# Patient Record
Sex: FEMALE | Race: WHITE | Employment: FULL TIME | URBAN - METROPOLITAN AREA
[De-identification: names, ages, dates, MRNs, and addresses within clinical notes are randomized per-mention and may not be internally consistent; named-entity substitution may affect disease eponyms.]

---

## 2023-09-20 PROBLEM — E55.9 VITAMIN D INSUFFICIENCY: Status: ACTIVE | Noted: 2023-09-20

## 2023-09-20 PROBLEM — E10.9 DIABETES MELLITUS TYPE 1 (MULTI): Status: ACTIVE | Noted: 2023-09-20

## 2023-09-20 PROBLEM — E10.65 TYPE 1 DIABETES MELLITUS WITH HYPERGLYCEMIA (MULTI): Status: ACTIVE | Noted: 2023-09-20

## 2023-09-20 PROBLEM — E03.9 HYPOTHYROIDISM: Status: ACTIVE | Noted: 2023-09-20

## 2023-09-20 PROBLEM — E11.10 DIABETIC KETOACIDOSIS (MULTI): Status: ACTIVE | Noted: 2023-09-20

## 2023-09-20 RX ORDER — NAPROXEN SODIUM 220 MG
TABLET ORAL
COMMUNITY
Start: 2019-02-14

## 2023-09-20 RX ORDER — ATORVASTATIN CALCIUM 10 MG/1
1 TABLET, FILM COATED ORAL DAILY
COMMUNITY
Start: 2018-10-16 | End: 2023-10-26 | Stop reason: ALTCHOICE

## 2023-09-20 RX ORDER — BLOOD-GLUCOSE TRANSMITTER
EACH MISCELLANEOUS
COMMUNITY
End: 2023-11-29 | Stop reason: WASHOUT

## 2023-09-20 RX ORDER — BLOOD-GLUCOSE METER
EACH MISCELLANEOUS
COMMUNITY
Start: 2022-09-28

## 2023-09-20 RX ORDER — BLOOD-GLUCOSE SENSOR
EACH MISCELLANEOUS
COMMUNITY
End: 2023-10-28

## 2023-09-20 RX ORDER — INSULIN LISPRO 100 [IU]/ML
50 INJECTION, SOLUTION INTRAVENOUS; SUBCUTANEOUS DAILY
COMMUNITY
Start: 2015-02-06 | End: 2023-11-29 | Stop reason: WASHOUT

## 2023-09-20 RX ORDER — INSULIN GLARGINE 100 [IU]/ML
INJECTION, SOLUTION SUBCUTANEOUS
COMMUNITY
Start: 2015-05-18 | End: 2023-10-26 | Stop reason: ALTCHOICE

## 2023-09-20 RX ORDER — BUSPIRONE HYDROCHLORIDE 5 MG/1
2 TABLET ORAL 2 TIMES DAILY
COMMUNITY
Start: 2023-01-25 | End: 2023-10-26 | Stop reason: ALTCHOICE

## 2023-09-20 RX ORDER — LEVOTHYROXINE SODIUM 150 UG/1
1 TABLET ORAL
COMMUNITY

## 2023-09-20 RX ORDER — ATORVASTATIN CALCIUM 40 MG/1
1 TABLET, FILM COATED ORAL DAILY
COMMUNITY

## 2023-09-20 RX ORDER — LANCETS 33 GAUGE
EACH MISCELLANEOUS
COMMUNITY
Start: 2022-09-28

## 2023-10-26 ENCOUNTER — TELEMEDICINE (OUTPATIENT)
Dept: PRIMARY CARE | Facility: CLINIC | Age: 28
End: 2023-10-26
Payer: COMMERCIAL

## 2023-10-26 VITALS — OXYGEN SATURATION: 97 %

## 2023-10-26 DIAGNOSIS — F41.9 ANXIETY: Primary | ICD-10-CM

## 2023-10-26 PROCEDURE — 99213 OFFICE O/P EST LOW 20 MIN: CPT | Performed by: NURSE PRACTITIONER

## 2023-10-26 RX ORDER — BUSPIRONE HYDROCHLORIDE 10 MG/1
10 TABLET ORAL 2 TIMES DAILY
COMMUNITY
Start: 2023-09-28 | End: 2023-10-26 | Stop reason: SDUPTHER

## 2023-10-26 RX ORDER — BUSPIRONE HYDROCHLORIDE 15 MG/1
15 TABLET ORAL 2 TIMES DAILY
Qty: 60 TABLET | Refills: 1 | Status: SHIPPED | OUTPATIENT
Start: 2023-10-26 | End: 2023-11-29 | Stop reason: SDUPTHER

## 2023-10-26 RX ORDER — BUSPIRONE HYDROCHLORIDE 15 MG/1
15 TABLET ORAL 2 TIMES DAILY
Qty: 60 TABLET | Refills: 1 | Status: SHIPPED | OUTPATIENT
Start: 2023-10-26 | End: 2023-10-26 | Stop reason: SDUPTHER

## 2023-10-26 ASSESSMENT — PAIN SCALES - GENERAL: PAINLEVEL: 0-NO PAIN

## 2023-10-28 DIAGNOSIS — E10.69 TYPE 1 DIABETES MELLITUS WITH OTHER SPECIFIED COMPLICATION (MULTI): Primary | ICD-10-CM

## 2023-10-28 RX ORDER — BLOOD-GLUCOSE SENSOR
EACH MISCELLANEOUS
Qty: 9 EACH | Refills: 3 | Status: SHIPPED | OUTPATIENT
Start: 2023-10-28

## 2023-11-02 DIAGNOSIS — E10.65 TYPE 1 DIABETES MELLITUS WITH HYPERGLYCEMIA (MULTI): Primary | ICD-10-CM

## 2023-11-02 RX ORDER — INSULIN LISPRO 100 [IU]/ML
100 INJECTION, SOLUTION INTRAVENOUS; SUBCUTANEOUS DAILY
Qty: 90 ML | Refills: 3 | Status: SHIPPED | OUTPATIENT
Start: 2023-11-02 | End: 2024-11-01

## 2023-11-27 ENCOUNTER — APPOINTMENT (OUTPATIENT)
Dept: PRIMARY CARE | Facility: CLINIC | Age: 28
End: 2023-11-27
Payer: COMMERCIAL

## 2023-11-29 ENCOUNTER — TELEMEDICINE (OUTPATIENT)
Dept: PRIMARY CARE | Facility: CLINIC | Age: 28
End: 2023-11-29
Payer: COMMERCIAL

## 2023-11-29 DIAGNOSIS — F41.9 ANXIETY: Primary | ICD-10-CM

## 2023-11-29 PROBLEM — E11.10 DIABETIC KETOACIDOSIS (MULTI): Status: RESOLVED | Noted: 2023-09-20 | Resolved: 2023-11-29

## 2023-11-29 PROBLEM — E03.9 HYPOTHYROIDISM: Status: RESOLVED | Noted: 2023-09-20 | Resolved: 2023-11-29

## 2023-11-29 PROBLEM — E03.9 HYPOTHYROIDISM, ACQUIRED: Status: ACTIVE | Noted: 2018-03-26

## 2023-11-29 PROBLEM — E10.65 TYPE 1 DIABETES MELLITUS WITH HYPERGLYCEMIA (MULTI): Status: RESOLVED | Noted: 2023-09-20 | Resolved: 2023-11-29

## 2023-11-29 PROCEDURE — 99213 OFFICE O/P EST LOW 20 MIN: CPT | Performed by: NURSE PRACTITIONER

## 2023-11-29 RX ORDER — BUSPIRONE HYDROCHLORIDE 30 MG/1
30 TABLET ORAL NIGHTLY
Qty: 30 TABLET | Refills: 5 | Status: SHIPPED | OUTPATIENT
Start: 2023-11-29 | End: 2023-12-27 | Stop reason: SDUPTHER

## 2023-11-29 RX ORDER — BUSPIRONE HYDROCHLORIDE 15 MG/1
15 TABLET ORAL
Qty: 30 TABLET | Refills: 5 | Status: SHIPPED | OUTPATIENT
Start: 2023-11-29 | End: 2023-12-27 | Stop reason: SDUPTHER

## 2023-11-29 ASSESSMENT — ANXIETY QUESTIONNAIRES
2. NOT BEING ABLE TO STOP OR CONTROL WORRYING: NEARLY EVERY DAY
GAD7 TOTAL SCORE: 20
1. FEELING NERVOUS, ANXIOUS, OR ON EDGE: NEARLY EVERY DAY
IF YOU CHECKED OFF ANY PROBLEMS ON THIS QUESTIONNAIRE, HOW DIFFICULT HAVE THESE PROBLEMS MADE IT FOR YOU TO DO YOUR WORK, TAKE CARE OF THINGS AT HOME, OR GET ALONG WITH OTHER PEOPLE: NOT DIFFICULT AT ALL
3. WORRYING TOO MUCH ABOUT DIFFERENT THINGS: NEARLY EVERY DAY
4. TROUBLE RELAXING: NEARLY EVERY DAY
5. BEING SO RESTLESS THAT IT IS HARD TO SIT STILL: MORE THAN HALF THE DAYS
7. FEELING AFRAID AS IF SOMETHING AWFUL MIGHT HAPPEN: NEARLY EVERY DAY
6. BECOMING EASILY ANNOYED OR IRRITABLE: NEARLY EVERY DAY

## 2023-11-29 ASSESSMENT — PATIENT HEALTH QUESTIONNAIRE - PHQ9
1. LITTLE INTEREST OR PLEASURE IN DOING THINGS: SEVERAL DAYS
SUM OF ALL RESPONSES TO PHQ9 QUESTIONS 1 AND 2: 1
2. FEELING DOWN, DEPRESSED OR HOPELESS: NOT AT ALL

## 2023-11-29 ASSESSMENT — PAIN SCALES - GENERAL: PAINLEVEL: 0-NO PAIN

## 2023-11-29 NOTE — PROGRESS NOTES
"PETRith patient's permission this is a telemedicine visit with video and audio.  History Of Present Illness  Haley Sheets is a 27 y.o. female who calls in for Follow-up (Buspirone follow up- pt states she has does not feel like the medication is completely taking care of her anxiety, still having anxiety every day).    HPI 27 year old female doing a telehealth for buspar increased to 15 BID still having issues with anxiety though she does feel like it is helping a little bit she is not feeling as good as she was hoping she would feel she would like to try increasing the medication if possible we will have her do 15 in the morning and 30 at night will have her schedule a 4-week follow-up to see how she is doing no other concerns today    Past Medical History  She has a past medical history of Other specified health status.    Medications  Current Outpatient Medications   Medication Instructions    atorvastatin (Lipitor) 40 mg tablet 1 tablet, oral, Daily    blood sugar diagnostic (OneTouch Verio test strips) strip as directed to test blood sugar daily In Vitro for 90 days    busPIRone (BUSPAR) 30 mg, oral, Nightly    busPIRone (BUSPAR) 15 mg, oral, Daily with breakfast    Dexcom G6 Sensor device USE AS DIRECTED EVERY 10 DAYS    glucagon (GLUCAGEN) 1 mg, intramuscular, as directed for severe hypoglycemia    insulin lispro (HUMALOG) 100 Units, subcutaneous, Daily, Take as directed per insulin instructions.    insulin syringe-needle U-100 31G X 5/16\" 0.5 mL syringe use 4-6 daily for injections as needed for insulin pump failure    lancets 33 gauge misc as directed to check blood sugar daily for 90 days    levonorgestrel (MIRENA UTRN) Mirena    levothyroxine (Synthroid, Levoxyl) 150 mcg tablet 1 tablet, oral, Daily before breakfast        Surgical History  She has a past surgical history that includes Mouth surgery (11/06/2014).     Social History  She reports that she has never smoked. She has never been exposed to " tobacco smoke. She has never used smokeless tobacco. She reports that she does not drink alcohol and does not use drugs.    Family History  Family History   Problem Relation Name Age of Onset    No Known Problems Mother      Heart disease Father      Hypertension Father      No Known Problems Brother      Diabetes type II Maternal Grandmother      Diabetes type II Maternal Grandfather      No Known Problems Paternal Grandmother          Allergies  Acetaminophen and Corticosteroids (glucocorticoids)    On video:     Appearance: Normal appearance.      Effort: Respiratory effort is normal. Speaking in full sentences. No respiratory distress.     Mood and Affect: Mood normal.         Thought Content: Thought content normal.         Judgment: Judgment normal.      Last Recorded Vitals  There were no vitals taken for this visit.  (Vitals are taken by patient at home, if any reported)    Relevant Results      Assessment/Plan   Haley was seen today for follow-up.  Diagnoses and all orders for this visit:  Anxiety (Primary)  -     busPIRone (Buspar) 30 mg tablet; Take 1 tablet (30 mg) by mouth once daily at bedtime.  -     busPIRone (Buspar) 15 mg tablet; Take 1 tablet (15 mg) by mouth once daily with a meal.          Jennyfer Pennington, APRN-CNP

## 2023-12-06 ENCOUNTER — APPOINTMENT (OUTPATIENT)
Dept: ENDOCRINOLOGY | Facility: CLINIC | Age: 28
End: 2023-12-06
Payer: COMMERCIAL

## 2023-12-27 DIAGNOSIS — F41.9 ANXIETY: ICD-10-CM

## 2023-12-27 RX ORDER — BUSPIRONE HYDROCHLORIDE 15 MG/1
15 TABLET ORAL
Qty: 30 TABLET | Refills: 0 | Status: SHIPPED | OUTPATIENT
Start: 2023-12-27 | End: 2024-01-26

## 2023-12-27 RX ORDER — BUSPIRONE HYDROCHLORIDE 30 MG/1
30 TABLET ORAL NIGHTLY
Qty: 30 TABLET | Refills: 0 | Status: SHIPPED | OUTPATIENT
Start: 2023-12-27 | End: 2024-01-26

## 2023-12-27 NOTE — PROGRESS NOTES
HPI:   27 yo with Diabetes 1(dx age 12) , Hashimoto's dz, Dyslipidemia, depression/anxiety presents for followup. A1c-7.9% today, last visit 7.6% Pt is testing sugars 7times per day, using dexcom . Pt is having low sugars 0-1 times/week. Pt is doing better with following a carb controlled diet and knows reasonable carb allowances. Pt is able to afford their medications. Pt is exercising.           Taking levothyroxine 150mcg, euthyroid, no obstructive sx.         Taking atorvastatin 40mg.           Tslim pump with dexcom g6         basals: (45.4 units)         mn-2         3am-1.7         1pm-2.4         5pm-2         10pm-1.4         I:C         1:5 mn         1:4 6am         1:7 at 1pm         5pm-1:5 - she did not increase to 1:4.5 after last visit         ISf-20         target: 110           30 days dexcom data: 56% in range, 1% low, pattern: mid 200's overnight, waking mid 100's and lunch, after lunch low 200's, upper 100's at dinner, after dinner mid 200's into bedtime.           -pt has been better about bolusing before meals, not limiting carbs at meals -some meals 80 grams    TDD:  111.18 units  Sleep mode: not set  Exercise mode: uses prn  Infusion sets: changing every 3 days, denies problems with sites  -back up basal insulin at home but may be   -pump failure protocol reviewed  -reinforced treating with 5-10 grams of fasting acting carbs for hypoglycemia     The pros and cons, risks and benefits and mechanism of action of the GLP-1 mimetics were discussed with the patient, reinforced not compatible with pregnancy (has IUD)  The patient was instructed to not eat if not hungry.   The patient was instructed on self-administration of the injected GLP-1 mimetic.     /80 (BP Location: Right arm, Patient Position: Sitting)   Pulse 79   Wt 101 kg (221 lb 9.6 oz)   BMI 36.88 kg/m²     Current Outpatient Medications:     atorvastatin (Lipitor) 40 mg tablet, Take 1 tablet (40 mg) by mouth once  "daily., Disp: , Rfl:     blood sugar diagnostic (OneTouch Verio test strips) strip, as directed to test blood sugar daily In Vitro for 90 days, Disp: , Rfl:     busPIRone (Buspar) 15 mg tablet, Take 1 tablet (15 mg) by mouth once daily with breakfast., Disp: 30 tablet, Rfl: 0    busPIRone (Buspar) 30 mg tablet, Take 1 tablet (30 mg) by mouth once daily at bedtime., Disp: 30 tablet, Rfl: 0    Dexcom G6 Sensor device, USE AS DIRECTED EVERY 10 DAYS, Disp: 9 each, Rfl: 3    insulin lispro (HumaLOG) 100 unit/mL injection, Inject 1 mL (100 Units) under the skin once daily. Take as directed per insulin instructions., Disp: 90 mL, Rfl: 3    insulin syringe-needle U-100 31G X 5/16\" 0.5 mL syringe, use 4-6 daily for injections as needed for insulin pump failure, Disp: , Rfl:     lancets 33 gauge misc, as directed to check blood sugar daily for 90 days, Disp: , Rfl:     levonorgestrel (MIRENA UTRN), Mirena, Disp: , Rfl:     levothyroxine (Synthroid, Levoxyl) 150 mcg tablet, Take 1 tablet (150 mcg) by mouth once daily in the morning. Take before meals., Disp: , Rfl:   Labs:  Lab Results   Component Value Date    WBC 8.5 09/13/2022    NRBC 0 09/13/2022    RBC 4.99 (H) 09/13/2022    HGB 14.5 09/13/2022    HCT 43.2 09/13/2022     09/13/2022     Lab Results   Component Value Date    CALCIUM 9.2 09/13/2022    AST 17 09/13/2022    ALKPHOS 105 09/13/2022    BILITOT 0.4 09/13/2022    PROT 7.3 09/13/2022    ALBUMIN 4.1 09/13/2022    GLOB 3.2 09/13/2022    AGR 1.3 (L) 09/13/2022     09/13/2022    K 4.0 09/13/2022     09/13/2022    CO2 21 (L) 09/13/2022    ANIONGAP 14 09/13/2022    BUN 11 09/13/2022    CREATININE 0.7 09/13/2022    UREACREAUR 15.7 09/13/2022    GLUCOSE 65 09/13/2022    ALT 23 09/13/2022    EGFR 123 09/13/2022     Lab Results   Component Value Date    CHOL 233 (H) 09/13/2022    TRIG 154 (H) 09/13/2022    HDL 40 (L) 09/13/2022    LDLCALC 162 (H) 09/13/2022     Lab Results   Component Value Date    " "MICROALBCREA 6.9 09/13/2022     Lab Results   Component Value Date    TSH 0.11 (L) 09/13/2022     No results found for: \"SNWBCEYL21\"  Lab Results   Component Value Date    HGBA1C 7.9 (A) 12/29/2023       Assessment and Plan:    1. Type 1 diabetes mellitus with hyperglycemia (CMS/HCC)  -postprandial hyperglycemia; increase ICR                breakfast to 1:3 (was 1:4)               lunch 1:6 (was 1:7)               dinner 1:3 (was 1:4)    -weight up 5.4 pounds, BMI 36.88, start ozempic 0.25 mg weekly for 4 weeks, then 0.5 mg (pending coverage),    reinforced lower carb meals 0-45 grams    -consider change in correction factor and addition of sleep mode next visit            2. Hypothyroidism, acquired  -on therapy, labs ordered today      Medical Decision Making  Complexity of problem:  Chronic illness of diabetes mellitus uncontrolled, progressing  Data analyzed and reviewed:  Reviewed prior notes,  Reviewed blood glucose data, labs including HgbA1c, lipids, serum chemistries  Ordered tests  Risk of complications and morbidities  Is definite because of use of insulin and risk of hypoglycemia  Prescription medications reviewed and modifications made  Compliance assessed  Addressed social determinants of health including food insecurity.      Treatment and plan discussed with Dr. Carreno. [facility] and       "

## 2023-12-27 NOTE — TELEPHONE ENCOUNTER
Per pt request please send in refill. Pt will be calling back to schedule follow up towards the end of January.

## 2023-12-29 ENCOUNTER — CLINICAL SUPPORT (OUTPATIENT)
Dept: ENDOCRINOLOGY | Facility: CLINIC | Age: 28
End: 2023-12-29
Payer: COMMERCIAL

## 2023-12-29 VITALS
DIASTOLIC BLOOD PRESSURE: 80 MMHG | HEART RATE: 79 BPM | BODY MASS INDEX: 36.88 KG/M2 | SYSTOLIC BLOOD PRESSURE: 110 MMHG | WEIGHT: 221.6 LBS

## 2023-12-29 DIAGNOSIS — E10.65 TYPE 1 DIABETES MELLITUS WITH HYPERGLYCEMIA (MULTI): Primary | ICD-10-CM

## 2023-12-29 DIAGNOSIS — E03.9 HYPOTHYROIDISM, ACQUIRED: ICD-10-CM

## 2023-12-29 LAB — POC HEMOGLOBIN A1C: 7.9 % (ref 4.2–6.5)

## 2023-12-29 PROCEDURE — 83036 HEMOGLOBIN GLYCOSYLATED A1C: CPT | Performed by: INTERNAL MEDICINE

## 2023-12-29 PROCEDURE — 99214 OFFICE O/P EST MOD 30 MIN: CPT | Performed by: INTERNAL MEDICINE

## 2023-12-29 PROCEDURE — 95251 CONT GLUC MNTR ANALYSIS I&R: CPT | Performed by: INTERNAL MEDICINE

## 2023-12-29 RX ORDER — SEMAGLUTIDE 0.68 MG/ML
0.5 INJECTION, SOLUTION SUBCUTANEOUS
Qty: 3 ML | Refills: 5 | Status: SHIPPED | OUTPATIENT
Start: 2023-12-29 | End: 2024-03-29 | Stop reason: SDUPTHER

## 2023-12-29 ASSESSMENT — PAIN SCALES - GENERAL: PAINLEVEL: 0-NO PAIN

## 2023-12-29 NOTE — PATIENT INSTRUCTIONS
See pump settings for changes    Start Ozempic 0.25 mg weekly for 4 weeks,  Then 0.5 mg weekly    Go for labwork

## 2023-12-29 NOTE — PROGRESS NOTES
I, Dr Jairon Carreno, have reviewed this progress note, medication list, vital signs, any pertinent lab values, and any CGM data if present with the [facility] and  face to face during this visit today. This note reflects the treatment plan that was made under my direction after reviewing the above mentioned elements while face to face with the patient and CDE.  I personally answered and addressed any questions and concerns the patient had during the visit today.  The CDE entered the data in this note under my direction and I personally reviewed it, signed any lab or medication orders that I instructed to be completed. I am the billing provider for this visit and the level of service was determined by my involvement in the Medical Decision Making Component of this visit while face to face with the patient.    Jairon Carreno MD

## 2023-12-30 ENCOUNTER — LAB (OUTPATIENT)
Dept: LAB | Facility: LAB | Age: 28
End: 2023-12-30
Payer: COMMERCIAL

## 2023-12-30 DIAGNOSIS — E03.9 HYPOTHYROIDISM, ACQUIRED: ICD-10-CM

## 2023-12-30 DIAGNOSIS — E10.65 TYPE 1 DIABETES MELLITUS WITH HYPERGLYCEMIA (MULTI): ICD-10-CM

## 2023-12-30 LAB
ALBUMIN SERPL-MCNC: 4.1 G/DL (ref 3.5–5)
ALP BLD-CCNC: 102 U/L (ref 35–125)
ALT SERPL-CCNC: 23 U/L (ref 5–40)
ANION GAP SERPL CALC-SCNC: 11 MMOL/L
AST SERPL-CCNC: 17 U/L (ref 5–40)
BILIRUB SERPL-MCNC: 0.5 MG/DL (ref 0.1–1.2)
BUN SERPL-MCNC: 13 MG/DL (ref 8–25)
CALCIUM SERPL-MCNC: 8.9 MG/DL (ref 8.5–10.4)
CHLORIDE SERPL-SCNC: 104 MMOL/L (ref 97–107)
CHOLEST SERPL-MCNC: 168 MG/DL (ref 133–200)
CHOLEST/HDLC SERPL: 3.3 {RATIO}
CO2 SERPL-SCNC: 24 MMOL/L (ref 24–31)
CREAT SERPL-MCNC: 0.8 MG/DL (ref 0.4–1.6)
CREAT UR-MCNC: 293.9 MG/DL
ERYTHROCYTE [DISTWIDTH] IN BLOOD BY AUTOMATED COUNT: 11.9 % (ref 11.5–14.5)
GFR SERPL CREATININE-BSD FRML MDRD: >90 ML/MIN/1.73M*2
GLUCOSE SERPL-MCNC: 186 MG/DL (ref 65–99)
HCT VFR BLD AUTO: 43.9 % (ref 36–46)
HDLC SERPL-MCNC: 51 MG/DL
HGB BLD-MCNC: 14.5 G/DL (ref 12–16)
LDLC SERPL CALC-MCNC: 101 MG/DL (ref 65–130)
MCH RBC QN AUTO: 28.8 PG (ref 26–34)
MCHC RBC AUTO-ENTMCNC: 33 G/DL (ref 32–36)
MCV RBC AUTO: 87 FL (ref 80–100)
MICROALBUMIN UR-MCNC: <12 MG/L (ref 0–23)
MICROALBUMIN/CREAT UR: NORMAL MG/G{CREAT}
NRBC BLD-RTO: 0 /100 WBCS (ref 0–0)
PLATELET # BLD AUTO: 327 X10*3/UL (ref 150–450)
POTASSIUM SERPL-SCNC: 4.8 MMOL/L (ref 3.4–5.1)
PROT SERPL-MCNC: 6.4 G/DL (ref 5.9–7.9)
RBC # BLD AUTO: 5.04 X10*6/UL (ref 4–5.2)
SODIUM SERPL-SCNC: 139 MMOL/L (ref 133–145)
TRIGL SERPL-MCNC: 79 MG/DL (ref 40–150)
TSH SERPL DL<=0.05 MIU/L-ACNC: 2.62 MIU/L (ref 0.27–4.2)
WBC # BLD AUTO: 6.9 X10*3/UL (ref 4.4–11.3)

## 2023-12-30 PROCEDURE — 82570 ASSAY OF URINE CREATININE: CPT

## 2023-12-30 PROCEDURE — 84443 ASSAY THYROID STIM HORMONE: CPT

## 2023-12-30 PROCEDURE — 80061 LIPID PANEL: CPT

## 2023-12-30 PROCEDURE — 82043 UR ALBUMIN QUANTITATIVE: CPT

## 2023-12-30 PROCEDURE — 85027 COMPLETE CBC AUTOMATED: CPT

## 2023-12-30 PROCEDURE — 36415 COLL VENOUS BLD VENIPUNCTURE: CPT

## 2023-12-30 PROCEDURE — 80053 COMPREHEN METABOLIC PANEL: CPT

## 2024-03-27 NOTE — PROGRESS NOTES
"HPI    29 yo with Diabetes 1(dx age 12) , Hashimoto's dz, Dyslipidemia, depression/anxiety, pt has IUD presents for followup. A1c-6.5% today.     Pt is testing sugars 7times per day, using dexcom g6 . Pt is having low sugars 0-1 times/week. Pt is doing better with following a carb controlled diet and knows reasonable carb allowances. Pt is able to afford their medications. Pt is exercising.    -add ozempic 0.5mg last visit, lost nearly 20lbs   30 days g6 data: 72% in range, 2% low, pattern: upper 100's overnight, waking mid 100's and mostly maintains through the day.  -since I:C increases pt feels like she is having lows after meals, often entering fewer carbs than she eats           Taking levothyroxine 150mcg, euthyroid, no obstructive sx.           Taking atorvastatin 40mg.           Tslim pump with dexcom g6         basals: (45.4 units)         mn-2         3am-1.7         1pm-2.4         5pm-2         10pm-1.4         I:C         1:5 mn         1:3 6am         1:6 at 1pm         5pm-1:4         10pm-1:5         ISf-20         target: 110            30 days dexcom data: 56% in range, 1% low, pattern: mid 200's overnight, waking mid 100's and lunch, after lunch low 200's, upper 100's at dinner, after dinner mid 200's into bedtime.                Current Outpatient Medications:     atorvastatin (Lipitor) 40 mg tablet, Take 1 tablet (40 mg) by mouth once daily., Disp: , Rfl:     blood sugar diagnostic (OneTouch Verio test strips) strip, as directed to test blood sugar daily In Vitro for 90 days, Disp: , Rfl:     Dexcom G6 Sensor device, USE AS DIRECTED EVERY 10 DAYS, Disp: 9 each, Rfl: 3    Dexcom G6 Transmitter device, , Disp: , Rfl:     insulin lispro (HumaLOG) 100 unit/mL injection, Inject 1 mL (100 Units) under the skin once daily. Take as directed per insulin instructions., Disp: 90 mL, Rfl: 3    insulin syringe-needle U-100 31G X 5/16\" 0.5 mL syringe, use 4-6 daily for injections as needed for insulin pump " "failure, Disp: , Rfl:     lancets 33 gauge misc, as directed to check blood sugar daily for 90 days, Disp: , Rfl:     levonorgestrel (MIRENA UTRN), Mirena, Disp: , Rfl:     levothyroxine (Synthroid, Levoxyl) 150 mcg tablet, Take 1 tablet (150 mcg) by mouth once daily in the morning. Take before meals., Disp: , Rfl:     semaglutide (Ozempic) 0.25 mg or 0.5 mg (2 mg/3 mL) pen injector, Inject 0.5 mg under the skin 1 (one) time per week., Disp: 3 mL, Rfl: 5    busPIRone (Buspar) 15 mg tablet, Take 1 tablet (15 mg) by mouth once daily with breakfast., Disp: 30 tablet, Rfl: 0    busPIRone (Buspar) 30 mg tablet, Take 1 tablet (30 mg) by mouth once daily at bedtime., Disp: 30 tablet, Rfl: 0    glucagon 3 mg/actuation spray,non-aerosol, Administer 1 spray into affected nostril(s) if needed (for severe hypoglycemia)., Disp: 2 each, Rfl: 1      Allergies as of 03/29/2024 - Reviewed 03/29/2024   Allergen Reaction Noted    Acetaminophen Unknown 12/05/2018    Corticosteroids (glucocorticoids) Unknown 12/05/2018         Review of Systems   Cardiology: Lightheadedness-denies.  Chest pain-denies.  Leg edema-denies.  Palpitations-denies.  Respiratory: Cough-denies. Shortness of breath-denies.  Wheezing-denies.  Gastroenterology: Constipation +,  Diarrhea-denies.  Heartburn-denies.  Endocrinology: Cold intolerance-denies.  Heat intolerance-denies.  Sweats-denies.  Neurology: Headache-denies.  Tremor-denies.  Neuropathy in extremities-denies.  Psychology: Low energy-denies.  Irritability-denies.  Sleep disturbances-denies.      /69 (BP Location: Right arm)   Pulse 82   Ht 1.651 m (5' 5\")   Wt 91.2 kg (201 lb)   BMI 33.45 kg/m²       Labs:  Lab Results   Component Value Date    WBC 6.9 12/30/2023    NRBC 0.0 12/30/2023    RBC 5.04 12/30/2023    HGB 14.5 12/30/2023    HCT 43.9 12/30/2023     12/30/2023     Lab Results   Component Value Date    CALCIUM 8.9 12/30/2023    AST 17 12/30/2023    ALKPHOS 102 12/30/2023    " "BILITOT 0.5 12/30/2023    PROT 6.4 12/30/2023    ALBUMIN 4.1 12/30/2023    GLOB 3.2 09/13/2022    AGR 1.3 (L) 09/13/2022     12/30/2023    K 4.8 12/30/2023     12/30/2023    CO2 24 12/30/2023    ANIONGAP 11 12/30/2023    BUN 13 12/30/2023    CREATININE 0.80 12/30/2023    UREACREAUR 15.7 09/13/2022    GLUCOSE 186 (H) 12/30/2023    ALT 23 12/30/2023    EGFR >90 12/30/2023     Lab Results   Component Value Date    CHOL 168 12/30/2023    TRIG 79 12/30/2023    HDL 51.0 12/30/2023    LDLCALC 101 12/30/2023     Lab Results   Component Value Date    MICROALBCREA  12/30/2023      Comment:      One or more analytes used in this calculation is outside of the analytical measurement range. Calculation cannot be performed.     Lab Results   Component Value Date    TSH 2.62 12/30/2023     No results found for: \"CIYABNIW88\"  Lab Results   Component Value Date    HGBA1C 6.5 03/29/2024         Physical Exam   General Appearance: pleasant, cooperative, no acute distress  HEENT: no chemosis, no proptosis, no lid lag, no lid retraction  Neck: no lymphadenopathy, no thyromegaly, no dominant thyroid nodules  Heart: no murmurs, regular rate and rhythm, S1 and S2  Lungs: no wheezes, no rhonci, no rales  Extremities: no lower extremity swelling      Assessment/Plan   1. Type 1 diabetes mellitus with hyperglycemia (CMS/HCC)  -A1c ordered and reviewed  -pump/dexcom data reviewed, scanned in epic  -labs reviewed, new labs ordered    -doing outstanding on ozempic, having constipation, suggest miralax daily titrated for 1 bm q 1-2 days    -do pump upgrade, switch to dexcom g7    -pt having lows after meals now, change I:C ratios  -mn:1:6, 6am 1:4 or 5, 1pm1:7 or 8, 5pm 1:5 or 6, 10pm 1:6    2. Hypothyroidism, acquired  -euthyroid, continue current therapy, repeat labs prior to next visit    3. Dyslipidemia  -on statin, wt loss and sugar control should help, repeat prior to next visit         Follow Up:  soni OrtaD    Medical " Decision Making  Complexity of problem: Chronic illness of diabetes mellitus uncontrolled, progressing  Data analyzed and reviewed: Reviewed prior notes, blood glucose data, labs including HgbA1c, lipids, serum chemistries.  Ordered tests.   Risk of complications and morbidities: Is definite because of use of insulin and risk of hypoglycemia.  Prescription medications reviewed and modifications made.  Compliance assessed.  Addressed social determinants of health including food insecurity.

## 2024-03-29 ENCOUNTER — OFFICE VISIT (OUTPATIENT)
Dept: ENDOCRINOLOGY | Facility: CLINIC | Age: 29
End: 2024-03-29
Payer: COMMERCIAL

## 2024-03-29 VITALS
HEART RATE: 82 BPM | BODY MASS INDEX: 33.49 KG/M2 | SYSTOLIC BLOOD PRESSURE: 101 MMHG | WEIGHT: 201 LBS | HEIGHT: 65 IN | DIASTOLIC BLOOD PRESSURE: 69 MMHG

## 2024-03-29 DIAGNOSIS — E10.65 TYPE 1 DIABETES MELLITUS WITH HYPERGLYCEMIA (MULTI): Primary | ICD-10-CM

## 2024-03-29 DIAGNOSIS — E78.2 MIXED HYPERLIPIDEMIA: ICD-10-CM

## 2024-03-29 DIAGNOSIS — E03.9 HYPOTHYROIDISM, ACQUIRED: ICD-10-CM

## 2024-03-29 LAB — POC HEMOGLOBIN A1C: 6.5 % (ref 4.2–6.5)

## 2024-03-29 PROCEDURE — 99214 OFFICE O/P EST MOD 30 MIN: CPT | Performed by: INTERNAL MEDICINE

## 2024-03-29 PROCEDURE — 83036 HEMOGLOBIN GLYCOSYLATED A1C: CPT | Performed by: INTERNAL MEDICINE

## 2024-03-29 PROCEDURE — 95251 CONT GLUC MNTR ANALYSIS I&R: CPT | Performed by: INTERNAL MEDICINE

## 2024-03-29 PROCEDURE — 1036F TOBACCO NON-USER: CPT | Performed by: INTERNAL MEDICINE

## 2024-03-29 PROCEDURE — 3078F DIAST BP <80 MM HG: CPT | Performed by: INTERNAL MEDICINE

## 2024-03-29 PROCEDURE — 3074F SYST BP LT 130 MM HG: CPT | Performed by: INTERNAL MEDICINE

## 2024-03-29 RX ORDER — BLOOD-GLUCOSE TRANSMITTER
EACH MISCELLANEOUS
COMMUNITY
Start: 2024-02-01

## 2024-03-29 RX ORDER — BLOOD-GLUCOSE SENSOR
EACH MISCELLANEOUS
Qty: 9 EACH | Refills: 3 | Status: SHIPPED | OUTPATIENT
Start: 2024-03-29

## 2024-03-29 RX ORDER — SEMAGLUTIDE 0.68 MG/ML
0.5 INJECTION, SOLUTION SUBCUTANEOUS
Qty: 9 ML | Refills: 1 | Status: SHIPPED | OUTPATIENT
Start: 2024-03-29

## 2024-03-29 ASSESSMENT — PATIENT HEALTH QUESTIONNAIRE - PHQ9
2. FEELING DOWN, DEPRESSED OR HOPELESS: NOT AT ALL
1. LITTLE INTEREST OR PLEASURE IN DOING THINGS: NOT AT ALL
SUM OF ALL RESPONSES TO PHQ9 QUESTIONS 1 & 2: 0

## 2024-03-29 ASSESSMENT — ENCOUNTER SYMPTOMS
DEPRESSION: 0
LOSS OF SENSATION IN FEET: 0
OCCASIONAL FEELINGS OF UNSTEADINESS: 0

## 2024-03-29 ASSESSMENT — PAIN SCALES - GENERAL: PAINLEVEL: 0-NO PAIN

## 2024-05-23 ENCOUNTER — TELEPHONE (OUTPATIENT)
Dept: ENDOCRINOLOGY | Facility: CLINIC | Age: 29
End: 2024-05-23
Payer: COMMERCIAL

## 2024-05-31 ENCOUNTER — TELEPHONE (OUTPATIENT)
Dept: ENDOCRINOLOGY | Facility: CLINIC | Age: 29
End: 2024-05-31
Payer: COMMERCIAL

## 2024-07-01 ENCOUNTER — APPOINTMENT (OUTPATIENT)
Dept: ENDOCRINOLOGY | Facility: CLINIC | Age: 29
End: 2024-07-01
Payer: COMMERCIAL

## 2024-07-10 ENCOUNTER — APPOINTMENT (OUTPATIENT)
Dept: ENDOCRINOLOGY | Facility: CLINIC | Age: 29
End: 2024-07-10
Payer: COMMERCIAL

## 2024-07-10 VITALS
DIASTOLIC BLOOD PRESSURE: 76 MMHG | HEART RATE: 72 BPM | SYSTOLIC BLOOD PRESSURE: 118 MMHG | WEIGHT: 198 LBS | BODY MASS INDEX: 32.95 KG/M2

## 2024-07-10 DIAGNOSIS — E78.2 MIXED HYPERLIPIDEMIA: ICD-10-CM

## 2024-07-10 DIAGNOSIS — E10.65 TYPE 1 DIABETES MELLITUS WITH HYPERGLYCEMIA (MULTI): Primary | ICD-10-CM

## 2024-07-10 LAB — POC HEMOGLOBIN A1C: 6.9 % (ref 4.2–6.5)

## 2024-07-10 PROCEDURE — 99214 OFFICE O/P EST MOD 30 MIN: CPT | Performed by: INTERNAL MEDICINE

## 2024-07-10 PROCEDURE — 83036 HEMOGLOBIN GLYCOSYLATED A1C: CPT | Performed by: INTERNAL MEDICINE

## 2024-07-10 PROCEDURE — 95251 CONT GLUC MNTR ANALYSIS I&R: CPT | Performed by: INTERNAL MEDICINE

## 2024-07-10 RX ORDER — SEMAGLUTIDE 1.34 MG/ML
1 INJECTION, SOLUTION SUBCUTANEOUS
Qty: 9 ML | Refills: 1 | Status: SHIPPED | OUTPATIENT
Start: 2024-07-10

## 2024-07-10 NOTE — PATIENT INSTRUCTIONS
"Changes made to pump settings:  - carb ratio 1p from 1:6 to now 1:5  - carb ratio 5p from 1:4 to now 1:3.5    Increase Ozempic to 1mg weekly   - if you're wanting to try increasing more slowly first, dialing to 54 clicks would be equivalent to \"0.75mg\"  "

## 2024-07-10 NOTE — PROGRESS NOTES
HPI     29 yo with Diabetes 1(dx age 12) , Hashimoto's dz, Dyslipidemia, depression/anxiety, pt has IUD presents for followup. A1c-6.5% last visit,  6.9% today.    Pt is testing sugars 7 times per day, using dexcom g6 . Pt is having low sugars 0-1 times/week. Pt is doing better with following a carb controlled diet and knows reasonable carb allowances. Pt is able to afford their medications. Pt is exercising.    -added ozempic 0.5mg Dec 2023 visit, wt down from 222lbs to now 198lbs today.       Taking levothyroxine 150mcg, euthyroid, no obstructive sx.           Taking atorvastatin 40mg.           PUMP:  tandem tslim with dexcom g6 cgm using control IQ  basals: (45.4 units)         12a 2         3a 1.7         1p 2.4         5p 2         10p 1.4  ICR:          12a 1:5           6a 1:3         1p 1:6         5p 1:4         10p 1:5  ISF 20       - 30 days dexcom data: 65% in range, 1% low, pattern: mid 200's overnight, waking mid 100's and lunch, after lunch low 200's, upper 100's at dinner, after dinner mid 200's into bedtime.        Current Outpatient Medications:     atorvastatin (Lipitor) 40 mg tablet, Take 1 tablet (40 mg) by mouth once daily., Disp: , Rfl:     blood sugar diagnostic (OneTouch Verio test strips) strip, as directed to test blood sugar daily In Vitro for 90 days, Disp: , Rfl:     busPIRone (Buspar) 15 mg tablet, Take 1 tablet (15 mg) by mouth once daily with breakfast., Disp: 30 tablet, Rfl: 0    busPIRone (Buspar) 30 mg tablet, Take 1 tablet (30 mg) by mouth once daily at bedtime., Disp: 30 tablet, Rfl: 0    Dexcom G6 Sensor device, USE AS DIRECTED EVERY 10 DAYS, Disp: 9 each, Rfl: 3    Dexcom G6 Transmitter device, , Disp: , Rfl:     Dexcom G7 Sensor device, -change every 10 days, replaces dexcom g6, Disp: 9 each, Rfl: 3    glucagon 3 mg/actuation spray,non-aerosol, Administer 1 spray into affected nostril(s) if needed (for severe hypoglycemia)., Disp: 2 each, Rfl: 1    insulin lispro (HumaLOG)  "100 unit/mL injection, Inject 1 mL (100 Units) under the skin once daily. Take as directed per insulin instructions., Disp: 90 mL, Rfl: 3    insulin syringe-needle U-100 31G X 5/16\" 0.5 mL syringe, use 4-6 daily for injections as needed for insulin pump failure, Disp: , Rfl:     lancets 33 gauge misc, as directed to check blood sugar daily for 90 days, Disp: , Rfl:     levonorgestrel (MIRENA UTRN), Mirena, Disp: , Rfl:     levothyroxine (Synthroid, Levoxyl) 150 mcg tablet, Take 1 tablet (150 mcg) by mouth once daily in the morning. Take before meals., Disp: , Rfl:     semaglutide (Ozempic) 0.25 mg or 0.5 mg (2 mg/3 mL) pen injector, Inject 0.5 mg under the skin 1 (one) time per week., Disp: 9 mL, Rfl: 1    Allergies as of 07/10/2024 - Reviewed 03/29/2024   Allergen Reaction Noted    Acetaminophen Unknown 12/05/2018    Corticosteroids (glucocorticoids) Unknown 12/05/2018       /76   Pulse 72   Wt 89.8 kg (198 lb)   BMI 32.95 kg/m²     Labs:   Lab Results   Component Value Date    WBC 6.9 12/30/2023    NRBC 0.0 12/30/2023    RBC 5.04 12/30/2023    HGB 14.5 12/30/2023    HCT 43.9 12/30/2023     12/30/2023     Lab Results   Component Value Date    CALCIUM 8.9 12/30/2023    AST 17 12/30/2023    ALKPHOS 102 12/30/2023    BILITOT 0.5 12/30/2023    PROT 6.4 12/30/2023    ALBUMIN 4.1 12/30/2023    GLOB 3.2 09/13/2022    AGR 1.3 (L) 09/13/2022     12/30/2023    K 4.8 12/30/2023     12/30/2023    CO2 24 12/30/2023    ANIONGAP 11 12/30/2023    BUN 13 12/30/2023    CREATININE 0.80 12/30/2023    UREACREAUR 15.7 09/13/2022    GLUCOSE 186 (H) 12/30/2023    ALT 23 12/30/2023    EGFR >90 12/30/2023     Lab Results   Component Value Date    CHOL 168 12/30/2023    TRIG 79 12/30/2023    HDL 51.0 12/30/2023    LDLCALC 101 12/30/2023     Lab Results   Component Value Date    MICROALBCREA  12/30/2023      Comment:      One or more analytes used in this calculation is outside of the analytical measurement range. " "Calculation cannot be performed.     Lab Results   Component Value Date    TSH 2.62 12/30/2023     No results found for: \"YAXPOAGO31\"  Lab Results   Component Value Date    HGBA1C 6.9 (A) 07/10/2024         Assessment/Plan   1. Type 1 diabetes mellitus with hyperglycemia (Multi)    -A1c ordered and reviewed  -pump/dexcom data reviewed, scanned in epic  -labs reviewed, active orders given last visit to repeat     -doing outstanding on ozempic, increase to 1mg dose    -do pump upgrade, switch to dexcom g7    -pt having highs after meals now, change I:C ratios  -try 1pm from 1:6 to now 1:5,  5pm from 1:4 to now 1:3.5      2. Dyslipidemia  -on statin, wt loss and sugar control should help  -repeat fasting labs ordered last visit         Follow up: [id number]    -labs/tests/notes reviewed  -reviewed and counseled patient on medication monitoring and side effects    Treatment and plan discussed with Dr. Carreno.  CIARA Connors, PharmD, BC-ADM, [facility].     Medical Decision Making  Complexity of problem: Chronic illness of diabetes mellitus uncontrolled, progressing  Data analyzed and reviewed: Reviewed prior notes, blood glucose data, labs including HgbA1c, lipids, serum chemistries.  Ordered tests.   Risk of complications and morbidities: Is definite because of use of insulin and risk of hypoglycemia.  Prescription medications reviewed and modifications made.  Compliance assessed.  Addressed social determinants of health including food insecurity.    "

## 2024-07-16 NOTE — PROGRESS NOTES
Attestation signed by Jairon Carreno MD on 7/16/24 at 9:35 AM.    I, Dr Jairon Carreno, have reviewed this progress note, medication list, vital signs, any pertinent lab values, and any CGM data if present with the [facility] and  face to face during this visit today. This note reflects the treatment plan that was made under my direction after reviewing the above mentioned elements while face to face with the patient and CDE.  I personally answered and addressed any questions and concerns the patient had during the visit today.  The CDE entered the data in this note under my direction and I personally reviewed it, signed any lab or medication orders that I instructed to be completed. I am the billing provider for this visit and the level of service was determined by my involvement in the Medical Decision Making Component of this visit while face to face with the patient.

## 2024-08-12 DIAGNOSIS — E10.65 TYPE 1 DIABETES MELLITUS WITH HYPERGLYCEMIA (MULTI): ICD-10-CM

## 2024-08-12 RX ORDER — INSULIN LISPRO 100 [IU]/ML
INJECTION, SOLUTION INTRAVENOUS; SUBCUTANEOUS
Qty: 90 ML | Refills: 3 | Status: SHIPPED | OUTPATIENT
Start: 2024-08-12

## 2024-08-16 DIAGNOSIS — E10.65 TYPE 1 DIABETES MELLITUS WITH HYPERGLYCEMIA (MULTI): Primary | ICD-10-CM

## 2024-08-16 RX ORDER — ATORVASTATIN CALCIUM 40 MG/1
40 TABLET, FILM COATED ORAL DAILY
Qty: 90 TABLET | Refills: 3 | Status: SHIPPED | OUTPATIENT
Start: 2024-08-16

## 2024-08-26 DIAGNOSIS — E10.65 TYPE 1 DIABETES MELLITUS WITH HYPERGLYCEMIA (MULTI): Primary | ICD-10-CM

## 2024-08-26 RX ORDER — LEVOTHYROXINE SODIUM 150 UG/1
150 TABLET ORAL EVERY MORNING
Qty: 90 TABLET | Refills: 3 | Status: SHIPPED | OUTPATIENT
Start: 2024-08-26

## 2024-10-15 ENCOUNTER — OFFICE VISIT (OUTPATIENT)
Dept: PRIMARY CARE | Facility: CLINIC | Age: 29
End: 2024-10-15
Payer: COMMERCIAL

## 2024-10-15 VITALS
OXYGEN SATURATION: 99 % | SYSTOLIC BLOOD PRESSURE: 122 MMHG | WEIGHT: 193 LBS | BODY MASS INDEX: 32.15 KG/M2 | RESPIRATION RATE: 18 BRPM | DIASTOLIC BLOOD PRESSURE: 76 MMHG | HEART RATE: 84 BPM | TEMPERATURE: 98.1 F | HEIGHT: 65 IN

## 2024-10-15 DIAGNOSIS — F40.243 ANXIETY WITH FLYING: Primary | ICD-10-CM

## 2024-10-15 DIAGNOSIS — Z23 ENCOUNTER FOR IMMUNIZATION: ICD-10-CM

## 2024-10-15 PROCEDURE — 1036F TOBACCO NON-USER: CPT | Performed by: NURSE PRACTITIONER

## 2024-10-15 PROCEDURE — 3008F BODY MASS INDEX DOCD: CPT | Performed by: NURSE PRACTITIONER

## 2024-10-15 PROCEDURE — 3074F SYST BP LT 130 MM HG: CPT | Performed by: NURSE PRACTITIONER

## 2024-10-15 PROCEDURE — 3078F DIAST BP <80 MM HG: CPT | Performed by: NURSE PRACTITIONER

## 2024-10-15 PROCEDURE — 90471 IMMUNIZATION ADMIN: CPT | Performed by: NURSE PRACTITIONER

## 2024-10-15 PROCEDURE — 90656 IIV3 VACC NO PRSV 0.5 ML IM: CPT | Performed by: NURSE PRACTITIONER

## 2024-10-15 PROCEDURE — 99214 OFFICE O/P EST MOD 30 MIN: CPT | Performed by: NURSE PRACTITIONER

## 2024-10-15 RX ORDER — DIAZEPAM 5 MG/1
5 TABLET ORAL NIGHTLY PRN
Qty: 2 TABLET | Refills: 0 | Status: SHIPPED | OUTPATIENT
Start: 2024-10-15 | End: 2024-10-17

## 2024-10-15 ASSESSMENT — ANXIETY QUESTIONNAIRES
GAD7 TOTAL SCORE: 16
4. TROUBLE RELAXING: SEVERAL DAYS
1. FEELING NERVOUS, ANXIOUS, OR ON EDGE: NEARLY EVERY DAY
5. BEING SO RESTLESS THAT IT IS HARD TO SIT STILL: MORE THAN HALF THE DAYS
6. BECOMING EASILY ANNOYED OR IRRITABLE: SEVERAL DAYS
3. WORRYING TOO MUCH ABOUT DIFFERENT THINGS: NEARLY EVERY DAY
IF YOU CHECKED OFF ANY PROBLEMS ON THIS QUESTIONNAIRE, HOW DIFFICULT HAVE THESE PROBLEMS MADE IT FOR YOU TO DO YOUR WORK, TAKE CARE OF THINGS AT HOME, OR GET ALONG WITH OTHER PEOPLE: SOMEWHAT DIFFICULT
7. FEELING AFRAID AS IF SOMETHING AWFUL MIGHT HAPPEN: NEARLY EVERY DAY
2. NOT BEING ABLE TO STOP OR CONTROL WORRYING: NEARLY EVERY DAY

## 2024-10-15 ASSESSMENT — ENCOUNTER SYMPTOMS
CARDIOVASCULAR NEGATIVE: 1
MUSCULOSKELETAL NEGATIVE: 1
GASTROINTESTINAL NEGATIVE: 1
CONSTITUTIONAL NEGATIVE: 1
RESPIRATORY NEGATIVE: 1

## 2024-10-15 ASSESSMENT — PATIENT HEALTH QUESTIONNAIRE - PHQ9
1. LITTLE INTEREST OR PLEASURE IN DOING THINGS: NOT AT ALL
SUM OF ALL RESPONSES TO PHQ9 QUESTIONS 1 AND 2: 0
2. FEELING DOWN, DEPRESSED OR HOPELESS: NOT AT ALL

## 2024-10-15 ASSESSMENT — PAIN SCALES - GENERAL: PAINLEVEL: 0-NO PAIN

## 2024-10-15 NOTE — PROGRESS NOTES
"Chief Complaint  Haley Sheets is a 28 y.o. female presenting for \"Anxiety (Pt is here to get medication for anxiety to fly- only wants 2 pills, one for the flight to [place] and one for the flight back ).\"    Anxiety           Haley Sheets is a 28 y.o. female presenting for fear of flying she is going to [place] to see Nikki Flores.  She is afraid to fly.  The last flight she was on was very bumpy and since she has been afraid.  Since she has had a very hard time flying.        Past Medical History  Patient Active Problem List    Diagnosis Date Noted   • Diabetes mellitus type 1 (Multi) 09/20/2023   • Vitamin D insufficiency 09/20/2023   • Hypothyroidism, acquired 03/26/2018        Medications  Current Outpatient Medications   Medication Instructions   • atorvastatin (LIPITOR) 40 mg, oral, Daily   • blood sugar diagnostic (OneTouch Verio test strips) strip as directed to test blood sugar daily In Vitro for 90 days   • Dexcom G6 Sensor device USE AS DIRECTED EVERY 10 DAYS   • Dexcom G6 Transmitter device    • Dexcom G7 Sensor device -change every 10 days, replaces dexcom g6   • diazePAM (VALIUM) 5 mg, oral, Nightly PRN   • glucagon 3 mg/actuation spray,non-aerosol 1 spray, nasal, As needed   • HumaLOG U-100 Insulin 100 unit/mL injection INJECT 100 UNITS UNDER THE SKIN DAILY VIA PUMP   • insulin syringe-needle U-100 31G X 5/16\" 0.5 mL syringe use 4-6 daily for injections as needed for insulin pump failure   • lancets 33 gauge misc as directed to check blood sugar daily for 90 days   • levonorgestrel (MIRENA UTRN) Mirena   • levothyroxine (SYNTHROID, LEVOXYL) 150 mcg, oral, Every morning, take on an empy stomach   • Ozempic 1 mg, subcutaneous, Once Weekly        Surgical History  She has a past surgical history that includes Mouth surgery (11/06/2014) and Abscess drainage (Left, 01/2020).     Social History  She reports that she has never smoked. She has never been exposed to tobacco smoke. She has never used smokeless " tobacco. She reports that she does not drink alcohol and does not use drugs.    Family History  Family History   Problem Relation Name Age of Onset   • No Known Problems Mother     • Heart disease Father     • Hypertension Father     • No Known Problems Brother     • Diabetes type II Maternal Grandmother     • Diabetes type II Maternal Grandfather     • No Known Problems Paternal Grandmother          Allergies  Acetaminophen and Corticosteroids (glucocorticoids)    ROS  Review of Systems   Constitutional: Negative.    Respiratory: Negative.     Cardiovascular: Negative.    Gastrointestinal: Negative.    Genitourinary: Negative.    Musculoskeletal: Negative.         Last Recorded Vitals  /76 (BP Location: Left arm, Patient Position: Sitting, BP Cuff Size: Adult)   Pulse 84   Temp 36.7 °C (98.1 °F)   Resp 18   Wt 87.5 kg (193 lb)   SpO2 99%     Physical Exam  Vitals and nursing note reviewed.   Constitutional:       Appearance: Normal appearance.   Cardiovascular:      Rate and Rhythm: Normal rate and regular rhythm.      Pulses: Normal pulses.      Heart sounds: Normal heart sounds.   Pulmonary:      Effort: Pulmonary effort is normal.      Breath sounds: Normal breath sounds.   Neurological:      Mental Status: She is alert.   Psychiatric:         Mood and Affect: Mood normal.       Relevant Results      Assessment/Plan   Haley was seen today for anxiety.  Diagnoses and all orders for this visit:  Anxiety with flying (Primary)  -     diazePAM (Valium) 5 mg tablet; Take 1 tablet (5 mg) by mouth as needed at bedtime for anxiety, sleep or muscle spasms for up to 2 days.  Encounter for immunization  -     Flu vaccine, trivalent, preservative free, age 6 months and greater (Fluarix/Fluzone/Flulaval)          COUNSELING      Medication education:              Education:  The patient is counseled regarding potential side-effects of any and all new medications             Understanding:  Patient expressed  understanding             Adherence:  No barriers to adherence identified        Jennyfer Pennington, APRN-CNP

## 2024-10-24 ENCOUNTER — TELEPHONE (OUTPATIENT)
Dept: PRIMARY CARE | Facility: CLINIC | Age: 29
End: 2024-10-24

## 2024-10-24 NOTE — TELEPHONE ENCOUNTER
"PT HAS APPOINTMENT LAST WEEK AND WAS GIVEN VALIUM AND SHE SAYS IT DID NOT WORK AT ALL SAYS SHE IS AT THE AIRPORT AND IS \"FREAKING OUT \" SHE IS ABOUT TO BE BOARDING ANOTHER FLIGHT MAY NOT BE ABLE TO ANSWER BECAUSE OF THAT BUT WOULD LIKE TO TALK TO HERNANDEZ ABOUT SOMETHING TO HELP HER GET THROUGH HER RETURN FLIGHT   "

## 2024-11-21 NOTE — PROGRESS NOTES
"HPI   27 yo with Diabetes 1(dx age 12) , Hashimoto's dz, Dyslipidemia, depression/anxiety, pt has IUD presents for followup. A1c-6.5% last visit,  6.9% today.     Pt is testing sugars 7 times per day, using dexcom g6 . Pt is having low sugars 0-1 times/week. Pt is doing better with following a carb controlled diet and knows reasonable carb allowances. Pt is able to afford their medications. Pt is exercising.     -added ozempic Dec 2023 visit, wt down from 222lbs, currently on 1mg dose      Taking levothyroxine 150mcg, euthyroid, no obstructive sx.           Taking atorvastatin 40mg.           PUMP:  tandem tslim with dexcom g6 cgm using control IQ  basals: (45.4 units)         12a 2         3a 1.7         1p 2.4         5p 2         10p 1.4  ICR:          12a 1:5           6a 1:3         1p 1:5         5p 1:3.5         10p 1:5  ISF 20        - 30 days dexcom data: 64% in range, 2% low, pattern: upper 100's overnight, low 100's on waking/upper 100's post, upper 100's until dinner, low 100's dinner/upper 100's post dinner into bedtime.  -noticed pattern of rising in am (has school some days/has am workouts at times)  -at times bolusing post meals and having stacking lows           Current Outpatient Medications:     atorvastatin (Lipitor) 40 mg tablet, TAKE 1 TABLET DAILY, Disp: 90 tablet, Rfl: 3    blood sugar diagnostic (OneTouch Verio test strips) strip, as directed to test blood sugar daily In Vitro for 90 days, Disp: , Rfl:     Dexcom G6 Sensor device, USE AS DIRECTED EVERY 10 DAYS, Disp: 9 each, Rfl: 3    Dexcom G6 Transmitter device, , Disp: , Rfl:     Dexcom G7 Sensor device, -change every 10 days, replaces dexcom g6, Disp: 9 each, Rfl: 3    HumaLOG U-100 Insulin 100 unit/mL injection, INJECT 100 UNITS UNDER THE SKIN DAILY VIA PUMP, Disp: 90 mL, Rfl: 3    insulin syringe-needle U-100 31G X 5/16\" 0.5 mL syringe, use 4-6 daily for injections as needed for insulin pump failure, Disp: , Rfl:     lancets 33 gauge " "misc, as directed to check blood sugar daily for 90 days, Disp: , Rfl:     levonorgestrel (MIRENA UTRN), Mirena, Disp: , Rfl:     levothyroxine (Synthroid, Levoxyl) 150 mcg tablet, TAKE 1 TABLET IN THE MORNING ON AN EMPTY STOMACH AS DIRECTED, Disp: 90 tablet, Rfl: 3    Ozempic 1 mg/dose (4 mg/3 mL) pen injector, Inject 1 mg under the skin 1 (one) time per week., Disp: 9 mL, Rfl: 1    diazePAM (Valium) 5 mg tablet, Take 1 tablet (5 mg) by mouth as needed at bedtime for anxiety, sleep or muscle spasms for up to 2 days., Disp: 2 tablet, Rfl: 0    glucagon 3 mg/actuation spray,non-aerosol, Administer 1 spray into affected nostril(s) if needed (for severe hypoglycemia)., Disp: 2 each, Rfl: 1      Allergies as of 11/22/2024 - Reviewed 11/22/2024   Allergen Reaction Noted    Acetaminophen Unknown 12/05/2018    Corticosteroids (glucocorticoids) Unknown 12/05/2018         Review of Systems   Cardiology: Lightheadedness-denies.  Chest pain-stress.  Leg edema-denies.  Palpitations-denies.  Respiratory: Cough-denies. Shortness of breath-denies.  Wheezing-denies.  Gastroenterology: Constipation-at times.  Diarrhea-at times.  Heartburn-denies.  Endocrinology: Cold intolerance +  Heat intolerance-denies.  Sweats-denies.  Neurology: Headache-denies.  Tremor-denies.  Neuropathy in extremities-denies.  Psychology: Low energy-denies.  Irritability-denies.  Sleep disturbances-denies.      /65   Pulse 85   Ht 1.651 m (5' 5\")   Wt 86.8 kg (191 lb 6.4 oz)   BMI 31.85 kg/m²       Labs:  Lab Results   Component Value Date    WBC 6.9 12/30/2023    NRBC 0.0 12/30/2023    RBC 5.04 12/30/2023    HGB 14.5 12/30/2023    HCT 43.9 12/30/2023     12/30/2023     Lab Results   Component Value Date    CALCIUM 8.9 12/30/2023    AST 17 12/30/2023    ALKPHOS 102 12/30/2023    BILITOT 0.5 12/30/2023    PROT 6.4 12/30/2023    ALBUMIN 4.1 12/30/2023    GLOB 3.2 09/13/2022    AGR 1.3 (L) 09/13/2022     12/30/2023    K 4.8 12/30/2023    " " 12/30/2023    CO2 24 12/30/2023    ANIONGAP 11 12/30/2023    BUN 13 12/30/2023    CREATININE 0.80 12/30/2023    UREACREAUR 15.7 09/13/2022    GLUCOSE 186 (H) 12/30/2023    ALT 23 12/30/2023    EGFR >90 12/30/2023     Lab Results   Component Value Date    CHOL 168 12/30/2023    TRIG 79 12/30/2023    HDL 51.0 12/30/2023    LDLCALC 101 12/30/2023     Lab Results   Component Value Date    MICROALBCREA  12/30/2023      Comment:      One or more analytes used in this calculation is outside of the analytical measurement range. Calculation cannot be performed.     Lab Results   Component Value Date    TSH 2.62 12/30/2023     No results found for: \"[id number]\"  Lab Results   Component Value Date    HGBA1C 6.8 (A) 11/22/2024         Physical Exam   General Appearance: pleasant, cooperative, no acute distress  HEENT: no chemosis, no proptosis, no lid lag, no lid retraction  Neck: no lymphadenopathy, no thyromegaly, no dominant thyroid nodules  Heart: no murmurs, regular rate and rhythm, S1 and S2  Lungs: no wheezes, no rhonci, no rales  Extremities: no lower extremity swelling      Assessment/Plan   1. Type 1 diabetes mellitus with hyperglycemia (Multi) (Primary)  -A1c ordered and reviewed  -labs reviewed, new labs ordered  -refills sent  -dexcom data reviewed, scanned in epic    -noticed pattern of rising in am (has school some days/has am workouts at times)  -at times bolusing post meals and having stacking lows       2. Mixed hyperlipidemia  -on statin and tolerating, please repeat labs    3. Hypothyroidism, acquired  -euthyroid on therapy, please repeat labs         Follow Up:  ARMAAN Cowart    Medical Decision Making  Complexity of problem: Chronic illness of diabetes mellitus uncontrolled, progressing  Data analyzed and reviewed: Reviewed prior notes, blood glucose data, labs including HgbA1c, lipids, serum chemistries.  Ordered tests.   Risk of complications and morbidities: Is definite because of use of insulin and " risk of hypoglycemia.  Prescription medications reviewed and modifications made.  Compliance assessed.  Addressed social determinants of health including food insecurity.

## 2024-11-22 ENCOUNTER — APPOINTMENT (OUTPATIENT)
Dept: ENDOCRINOLOGY | Facility: CLINIC | Age: 29
End: 2024-11-22
Payer: COMMERCIAL

## 2024-11-22 VITALS
SYSTOLIC BLOOD PRESSURE: 103 MMHG | DIASTOLIC BLOOD PRESSURE: 65 MMHG | WEIGHT: 191.4 LBS | BODY MASS INDEX: 31.89 KG/M2 | HEIGHT: 65 IN | HEART RATE: 85 BPM

## 2024-11-22 DIAGNOSIS — E03.9 HYPOTHYROIDISM, ACQUIRED: ICD-10-CM

## 2024-11-22 DIAGNOSIS — E10.65 TYPE 1 DIABETES MELLITUS WITH HYPERGLYCEMIA (MULTI): Primary | ICD-10-CM

## 2024-11-22 DIAGNOSIS — E78.2 MIXED HYPERLIPIDEMIA: ICD-10-CM

## 2024-11-22 LAB — POC HEMOGLOBIN A1C: 6.8 % (ref 4.2–6.5)

## 2024-11-22 PROCEDURE — 3074F SYST BP LT 130 MM HG: CPT | Performed by: INTERNAL MEDICINE

## 2024-11-22 PROCEDURE — 83036 HEMOGLOBIN GLYCOSYLATED A1C: CPT | Performed by: INTERNAL MEDICINE

## 2024-11-22 PROCEDURE — 1036F TOBACCO NON-USER: CPT | Performed by: INTERNAL MEDICINE

## 2024-11-22 PROCEDURE — 3078F DIAST BP <80 MM HG: CPT | Performed by: INTERNAL MEDICINE

## 2024-11-22 PROCEDURE — 99214 OFFICE O/P EST MOD 30 MIN: CPT | Performed by: INTERNAL MEDICINE

## 2024-11-22 PROCEDURE — 3008F BODY MASS INDEX DOCD: CPT | Performed by: INTERNAL MEDICINE

## 2024-11-22 PROCEDURE — 95251 CONT GLUC MNTR ANALYSIS I&R: CPT | Performed by: INTERNAL MEDICINE

## 2024-11-22 RX ORDER — BLOOD-GLUCOSE METER
EACH MISCELLANEOUS
Qty: 300 STRIP | Refills: 3 | Status: SHIPPED | OUTPATIENT
Start: 2024-11-22

## 2024-11-22 ASSESSMENT — LIFESTYLE VARIABLES
HOW MANY STANDARD DRINKS CONTAINING ALCOHOL DO YOU HAVE ON A TYPICAL DAY: PATIENT DOES NOT DRINK
HOW OFTEN DO YOU HAVE A DRINK CONTAINING ALCOHOL: NEVER
AUDIT-C TOTAL SCORE: 0
HOW OFTEN DO YOU HAVE SIX OR MORE DRINKS ON ONE OCCASION: NEVER
SKIP TO QUESTIONS 9-10: 1

## 2024-11-22 ASSESSMENT — PATIENT HEALTH QUESTIONNAIRE - PHQ9
1. LITTLE INTEREST OR PLEASURE IN DOING THINGS: NOT AT ALL
2. FEELING DOWN, DEPRESSED OR HOPELESS: NOT AT ALL
SUM OF ALL RESPONSES TO PHQ9 QUESTIONS 1 & 2: 0

## 2024-11-22 ASSESSMENT — PAIN SCALES - GENERAL: PAINLEVEL_OUTOF10: 0-NO PAIN

## 2024-12-09 DIAGNOSIS — E10.65 TYPE 1 DIABETES MELLITUS WITH HYPERGLYCEMIA (MULTI): ICD-10-CM

## 2024-12-09 RX ORDER — SEMAGLUTIDE 1.34 MG/ML
INJECTION, SOLUTION SUBCUTANEOUS
Qty: 9 ML | Refills: 3 | Status: SHIPPED | OUTPATIENT
Start: 2024-12-09

## 2024-12-19 ENCOUNTER — OFFICE VISIT (OUTPATIENT)
Dept: OBSTETRICS AND GYNECOLOGY | Facility: CLINIC | Age: 29
End: 2024-12-19
Payer: COMMERCIAL

## 2024-12-19 VITALS
DIASTOLIC BLOOD PRESSURE: 80 MMHG | HEIGHT: 65 IN | SYSTOLIC BLOOD PRESSURE: 100 MMHG | WEIGHT: 191.4 LBS | BODY MASS INDEX: 31.89 KG/M2

## 2024-12-19 DIAGNOSIS — Z30.432 ENCOUNTER FOR IUD REMOVAL: ICD-10-CM

## 2024-12-19 DIAGNOSIS — Z30.09 ENCOUNTER FOR COUNSELING REGARDING CONTRACEPTION: Primary | ICD-10-CM

## 2024-12-19 PROCEDURE — 58301 REMOVE INTRAUTERINE DEVICE: CPT | Performed by: OBSTETRICS & GYNECOLOGY

## 2024-12-19 PROCEDURE — 99204 OFFICE O/P NEW MOD 45 MIN: CPT | Performed by: OBSTETRICS & GYNECOLOGY

## 2024-12-19 PROCEDURE — 1036F TOBACCO NON-USER: CPT | Performed by: OBSTETRICS & GYNECOLOGY

## 2024-12-19 PROCEDURE — 3008F BODY MASS INDEX DOCD: CPT | Performed by: OBSTETRICS & GYNECOLOGY

## 2024-12-19 PROCEDURE — 99214 OFFICE O/P EST MOD 30 MIN: CPT | Mod: 25 | Performed by: OBSTETRICS & GYNECOLOGY

## 2024-12-19 PROCEDURE — 3079F DIAST BP 80-89 MM HG: CPT | Performed by: OBSTETRICS & GYNECOLOGY

## 2024-12-19 PROCEDURE — 3074F SYST BP LT 130 MM HG: CPT | Performed by: OBSTETRICS & GYNECOLOGY

## 2024-12-19 RX ORDER — NORELGESTROMIN AND ETHINYL ESTRADIOL 35; 150 UG/MG; UG/MG
PATCH TRANSDERMAL
Qty: 3 PATCH | Refills: 6 | Status: SHIPPED | OUTPATIENT
Start: 2024-12-19

## 2024-12-19 ASSESSMENT — PATIENT HEALTH QUESTIONNAIRE - PHQ9
2. FEELING DOWN, DEPRESSED OR HOPELESS: NOT AT ALL
1. LITTLE INTEREST OR PLEASURE IN DOING THINGS: NOT AT ALL
SUM OF ALL RESPONSES TO PHQ9 QUESTIONS 1 AND 2: 0

## 2024-12-19 ASSESSMENT — ENCOUNTER SYMPTOMS
DEPRESSION: 0
OCCASIONAL FEELINGS OF UNSTEADINESS: 0
LOSS OF SENSATION IN FEET: 0

## 2024-12-19 ASSESSMENT — PAIN SCALES - GENERAL: PAINLEVEL_OUTOF10: 0-NO PAIN

## 2024-12-19 NOTE — PROGRESS NOTES
Haley Sheets is a 29 y.o. female,  who presented for contraception counselling       She has been on IUD for more than 5 years   Will be trying to get pregnant soon     Wanted to discuss different options       Obstetrical History:  OB History          0    Para   0    Term   0       0    AB   0    Living   0         SAB   0    IAB   0    Ectopic   0    Multiple   0    Live Births   0                  Gynecological history:  Menarche: 14   years old   Periods regular           Vaginal discharge    No           Past Medical History:   Diagnosis Date    Diabetes mellitus type I (Multi)     Hyperlipidemia     Hypothyroidism     Other specified health status     No pertinent past medical history     Past Surgical History:   Procedure Laterality Date    ABCESS DRAINAGE Left 2020    breast    MOUTH SURGERY  2014    Oral Surgery Tooth Extraction     Family History   Problem Relation Name Age of Onset    No Known Problems Mother      Heart disease Father      Hypertension Father      No Known Problems Brother      Diabetes type II Maternal Grandmother      Diabetes type II Maternal Grandfather      No Known Problems Paternal Grandmother       Social History     Tobacco Use    Smoking status: Never     Passive exposure: Never    Smokeless tobacco: Never   Vaping Use    Vaping status: Never Used   Substance Use Topics    Alcohol use: Not Currently    Drug use: Never     Social History     Tobacco Use   Smoking Status Never    Passive exposure: Never   Smokeless Tobacco Never       Current Outpatient Medications on File Prior to Visit   Medication Sig Dispense Refill    atorvastatin (Lipitor) 40 mg tablet TAKE 1 TABLET DAILY 90 tablet 3    blood sugar diagnostic (OneTouch Verio test strips) strip Test tid 300 strip 3    Dexcom G6 Sensor device USE AS DIRECTED EVERY 10 DAYS 9 each 3    Dexcom G6 Transmitter device       Dexcom G7 Sensor device -change every 10 days, replaces dexcom g6 9 each 3     "diazePAM (Valium) 5 mg tablet Take 1 tablet (5 mg) by mouth as needed at bedtime for anxiety, sleep or muscle spasms for up to 2 days. 2 tablet 0    glucagon 3 mg/actuation spray,non-aerosol Administer 1 spray into affected nostril(s) if needed (for severe hypoglycemia). 2 each 1    HumaLOG U-100 Insulin 100 unit/mL injection INJECT 100 UNITS UNDER THE SKIN DAILY VIA PUMP 90 mL 3    insulin syringe-needle U-100 31G X 5/16\" 0.5 mL syringe use 4-6 daily for injections as needed for insulin pump failure      lancets 33 gauge misc as directed to check blood sugar daily for 90 days      levonorgestrel (MIRENA UTRN) Mirena      levothyroxine (Synthroid, Levoxyl) 150 mcg tablet TAKE 1 TABLET IN THE MORNING ON AN EMPTY STOMACH AS DIRECTED 90 tablet 3    Ozempic 1 mg/dose (4 mg/3 mL) pen injector INJECT 1 MG UNDER THE SKIN WEEKLY (DOSE INCREASE) 9 mL 3     No current facility-administered medications on file prior to visit.     Allergies   Allergen Reactions    Acetaminophen Unknown     ALTERS GLUCOSE READINGS    Corticosteroids (Glucocorticoids) Unknown     Other reaction(s): Other: See Comments   PCP TOLD NOT TO TAKE STEROIDS D/T DIABETES     PCP TOLD NOT TO TAKE STEROIDS D/T DIABETES         REVIEW OF SYSTEMS:    General: No weight loss, malaise or fevers or other constitutional symptoms.  Neuro: No history of TIA's, stroke,.  No neurological symptoms or problems. No visual changes  Respiratory: No history of respiratory/pulmonary symptoms or problems.  Cardiovascular: No history of cardiovascular symptoms or problems.  GI: No history of GI symptoms or problems.   : No history of UTI in past 6 weeks.  No history of  symptoms or problems.  BREASTS: No skin dimpling, nipple discharge or masses.  Endocrine: No history of diabetes. No Thyroid symptoms  Hematology: No history of bleeding or clotting disorder.  Skin: Negative for lesions, rash, and itching.      PHYSICAL EXAMINATION:    /80   Ht 1.651 m (5' 5\")   Wt " 86.8 kg (191 lb 6.4 oz)   LMP  (LMP Unknown)   BMI 31.85 kg/m²   GENERAL: pleasant, female in no apparent distress  HEENT: Normocephalic, atraumatic, mucus membranes moist and no lesions  NEURO: alert and oriented x3,exam grossly non-focal  NECK: Supple, full range of motion, no adenopathy and thyroid normal  DERMATOLOGY: Normal, without lesions, non-icteric and non-hirsute       ABDOMEN: soft, non-tender and no masses  PELVIC: external genitalia normal, normal Bartholin's glands, urethra, Klemme's glands, no vulvar lesions, no cervical lesions, good vaginal support, physiologic discharge present, normal appearing perineal body and perianal region  BIMANUAL: uterus normal size, shape and consistency, no adnexal masses and non-tender    After verbal consent   IUD removed by pulling the strings           ASSESSMENT and Plan:    Haley Sheets is a 29 y.o. female,  who  presented for contraception counseling visit . We had a   discussion about different forms of contraception including combined hormonal contraception, patch, barriers, vaginal ring, IUD, progestin injections , barriers and subdermal implants, .  We also spoke about the permanent sterilization including the salpingectomy and vasectomy.   She was counseled about the side effects, risks, non-contraceptive benefits and the impact of sexually transmitted disease. Side effect of hormonal contraception discussed   including unscheduled bleeding, cardiovascular risk, venous thromboembolism risk bloating, nausea and breast tenderness. Most data suggests that weight gain is not a consistent finding was DUONG. Also the effect on ozepic on IUD       IUD risks were discussed including expulsion, pelvic inflammatory disease (PID), contraceptive failure, with increased risk of ectopic pregnancy if failure does occur, and perforation. Less serious side effects include malposition, pain, and irregular bleeding.     She opted for Patch     And she will come for  follow up in 8-12 weeks    She will also start folic acd before trying to conceive  Stop ozempic and lipitor prior to preg      Sanya Lin MD      This note was produced with voice recognition software and may contain errors in grammar, spelling and content.  If any questions, please feel free to contact me.     I was accompanied by Female Chaperone, LPN  during the exam

## 2024-12-20 ENCOUNTER — TELEPHONE (OUTPATIENT)
Dept: OBSTETRICS AND GYNECOLOGY | Facility: CLINIC | Age: 29
End: 2024-12-20
Payer: COMMERCIAL

## 2024-12-20 DIAGNOSIS — Z30.09 ENCOUNTER FOR COUNSELING REGARDING CONTRACEPTION: ICD-10-CM

## 2024-12-20 NOTE — TELEPHONE ENCOUNTER
Pt needs 1 month rx Xulane patches sent to [email or web address] in [place] on [address], and pt would also like a 3 month rx of Xulane sent to [facility].

## 2024-12-23 RX ORDER — NORELGESTROMIN AND ETHINYL ESTRADIOL 35; 150 UG/MG; UG/MG
PATCH TRANSDERMAL
Qty: 3 PATCH | Refills: 6 | Status: SHIPPED | OUTPATIENT
Start: 2024-12-23 | End: 2024-12-23 | Stop reason: SDUPTHER

## 2024-12-23 RX ORDER — NORELGESTROMIN AND ETHINYL ESTRADIOL 35; 150 UG/MG; UG/MG
PATCH TRANSDERMAL
Qty: 3 PATCH | Refills: 6 | Status: SHIPPED | OUTPATIENT
Start: 2024-12-23

## 2025-02-16 NOTE — PROGRESS NOTES
Subjective   Patient ID: Haley Sheets is a 27 y.o. female who presents for Follow-up ([phone]-   4 WEEK BUSPAR FOLLOW UP- PT STATES SHE IS NOT SURE IF THIS MED IS DOING ANYTHIGN WOULD LIKE TO DISCUSS OPTIONS./ANY MEDICATIONS TODAY PLEASE SEND RX TO [facility] [address]).    HPI With patient's permission, this is a Telemedicine visit with video and audio. The provider, and patient participated in this telemedicine encounter.     Pt is still having panic attacks, slight change, no side effects.  She has tried prozac, did not like, and lexapro, which she paired with buspar.  Depression is not bad, mostly the anxiety.  She is willing to try buspar at 15 mg bid first to see if this helps, will have her follow up in for weeks.  NO SI    Review of Systems   Psychiatric/Behavioral:          Pos for anxiety   All other systems reviewed and are negative.      Objective   SpO2 97%       Physical Exam Well developed well nourished female, Alert and oriented x 3, judgement and insight intact, able to speak in full sentences, non labored breathing, no joint swelling, normal ROM of all extremities, speech clear, face symmetrical, no rash visible.     Assessment/Plan     Diagnoses and all orders for this visit:  Anxiety  -     busPIRone (Buspar) 15 mg tablet; Take 1 tablet (15 mg) by mouth 2 times a day.         
8

## 2025-03-25 ENCOUNTER — OFFICE VISIT (OUTPATIENT)
Dept: PRIMARY CARE | Facility: CLINIC | Age: 30
End: 2025-03-25
Payer: COMMERCIAL

## 2025-03-25 VITALS
WEIGHT: 195 LBS | RESPIRATION RATE: 18 BRPM | TEMPERATURE: 99.3 F | SYSTOLIC BLOOD PRESSURE: 112 MMHG | DIASTOLIC BLOOD PRESSURE: 78 MMHG | BODY MASS INDEX: 32.49 KG/M2 | HEART RATE: 74 BPM | HEIGHT: 65 IN | OXYGEN SATURATION: 99 %

## 2025-03-25 DIAGNOSIS — Z30.09 ENCOUNTER FOR COUNSELING REGARDING CONTRACEPTION: ICD-10-CM

## 2025-03-25 DIAGNOSIS — N92.0 MENORRHAGIA WITH REGULAR CYCLE: Primary | ICD-10-CM

## 2025-03-25 DIAGNOSIS — Z01.84 IMMUNITY STATUS TESTING: ICD-10-CM

## 2025-03-25 PROCEDURE — 3078F DIAST BP <80 MM HG: CPT | Performed by: NURSE PRACTITIONER

## 2025-03-25 PROCEDURE — 3074F SYST BP LT 130 MM HG: CPT | Performed by: NURSE PRACTITIONER

## 2025-03-25 PROCEDURE — 3008F BODY MASS INDEX DOCD: CPT | Performed by: NURSE PRACTITIONER

## 2025-03-25 PROCEDURE — 99214 OFFICE O/P EST MOD 30 MIN: CPT | Performed by: NURSE PRACTITIONER

## 2025-03-25 RX ORDER — NORELGESTROMIN AND ETHINYL ESTRADIOL 35; 150 UG/MG; UG/MG
PATCH TRANSDERMAL
Qty: 3 PATCH | Refills: 0 | Status: SHIPPED | OUTPATIENT
Start: 2025-03-25 | End: 2026-03-25

## 2025-03-25 RX ORDER — NORELGESTROMIN AND ETHINYL ESTRADIOL 35; 150 UG/MG; UG/MG
PATCH TRANSDERMAL
Qty: 3 PATCH | Refills: 2 | Status: SHIPPED | OUTPATIENT
Start: 2025-03-25

## 2025-03-25 ASSESSMENT — PATIENT HEALTH QUESTIONNAIRE - PHQ9
SUM OF ALL RESPONSES TO PHQ9 QUESTIONS 1 AND 2: 0
1. LITTLE INTEREST OR PLEASURE IN DOING THINGS: NOT AT ALL
2. FEELING DOWN, DEPRESSED OR HOPELESS: NOT AT ALL

## 2025-03-25 ASSESSMENT — PAIN SCALES - GENERAL: PAINLEVEL_OUTOF10: 0-NO PAIN

## 2025-03-25 NOTE — PROGRESS NOTES
"Chief Complaint  Haley Sheets is a 29 y.o. female presenting for \"Immunizations (Pt wants to know if she needs a MMR booster - due to being diabetic/Also has not been able to get a hold of ob regarding birth control refill asking if one month can be sent to [facility] and the rest to [facility]).\"    HPI     Haley Sheets is a 29 y.o. female presenting to discuss her immunization status she was concerned because of the measles outbreak in [place] and wanted to know if she needs to have another booster she is up-to-date on her vaccines she would like to be titered for this also needs a refill on her birth control she is up-to-date with her Pap      Past Medical History  Patient Active Problem List    Diagnosis Date Noted    Diabetes mellitus type 1 (Multi) 09/20/2023    Vitamin D insufficiency 09/20/2023    Hypothyroidism, acquired 03/26/2018        Medications  Current Outpatient Medications   Medication Instructions    atorvastatin (LIPITOR) 40 mg, oral, Daily    blood sugar diagnostic (OneTouch Verio test strips) strip Test tid    Dexcom G6 Sensor device USE AS DIRECTED EVERY 10 DAYS    Dexcom G6 Transmitter device     Dexcom G7 Sensor device -change every 10 days, replaces dexcom g6    glucagon 3 mg/actuation spray,non-aerosol 1 spray, nasal, As needed    HumaLOG U-100 Insulin 100 unit/mL injection INJECT 100 UNITS UNDER THE SKIN DAILY VIA PUMP    insulin syringe-needle U-100 31G X 5/16\" 0.5 mL syringe use 4-6 daily for injections as needed for insulin pump failure    lancets 33 gauge misc as directed to check blood sugar daily for 90 days    levonorgestrel (MIRENA UTRN) Mirena    levothyroxine (SYNTHROID, LEVOXYL) 150 mcg, oral, Every morning, take on an empy stomach    norelgestromin-ethin.estradioL (Xulane) 150-35 mcg/24 hr Apply 1 patch each week for 3 weeks, then remove for 1 week.    norelgestromin-ethin.estradioL (Xulane) 150-35 mcg/24 hr Apply 1 patch each week for 3 weeks, then remove for 1 " week.    Ozempic 1 mg/dose (4 mg/3 mL) pen injector INJECT 1 MG UNDER THE SKIN WEEKLY (DOSE INCREASE)        Surgical History  She has a past surgical history that includes Mouth surgery (11/06/2014) and Abscess drainage (Left, 01/2020).     Social History  She reports that she has never smoked. She has never been exposed to tobacco smoke. She has never used smokeless tobacco. She reports that she does not currently use alcohol. She reports that she does not use drugs.    Family History  Family History   Problem Relation Name Age of Onset    No Known Problems Mother      Heart disease Father      Hypertension Father      No Known Problems Brother      Diabetes type II Maternal Grandmother      Diabetes type II Maternal Grandfather      No Known Problems Paternal Grandmother          Allergies  Acetaminophen and Corticosteroids (glucocorticoids)    ROS  Review of Systems   Constitutional: Negative.    Respiratory: Negative.     Cardiovascular: Negative.    Gastrointestinal: Negative.    Genitourinary: Negative.    Musculoskeletal: Negative.         Last Recorded Vitals  /78 (BP Location: Left arm, Patient Position: Sitting, BP Cuff Size: Adult)   Pulse 74   Temp 37.4 °C (99.3 °F)   Resp 18   Wt 88.5 kg (195 lb)   SpO2 99%     Physical Exam  Vitals and nursing note reviewed.   Constitutional:       Appearance: Normal appearance.   Cardiovascular:      Rate and Rhythm: Normal rate and regular rhythm.      Pulses: Normal pulses.      Heart sounds: Normal heart sounds.   Pulmonary:      Effort: Pulmonary effort is normal.      Breath sounds: Normal breath sounds.   Neurological:      Mental Status: She is alert.         Relevant Results      Assessment/Plan   Haley was seen today for immunizations.  Diagnoses and all orders for this visit:  Menorrhagia with regular cycle (Primary)  -     norelgestromin-ethin.estradioL (Xulane) 150-35 mcg/24 hr; Apply 1 patch each week for 3 weeks, then remove for 1  week.  Encounter for counseling regarding contraception  -     norelgestromin-ethin.estradioL (Xulane) 150-35 mcg/24 hr; Apply 1 patch each week for 3 weeks, then remove for 1 week.  Immunity status testing  -     Rubeola Antibody, IgG; Future  -     Rubeola Antibody, IgG          COUNSELING      Medication education:              Education:  The patient is counseled regarding potential side-effects of any and all new medications             Understanding:  Patient expressed understanding             Adherence:  No barriers to adherence identified        Jennyfer Pennington, APRN-CNP

## 2025-03-26 LAB
ALBUMIN SERPL-MCNC: 4.5 G/DL (ref 3.6–5.1)
ALBUMIN/CREAT UR: NORMAL MG/G CREAT
ALP SERPL-CCNC: 87 U/L (ref 31–125)
ALT SERPL-CCNC: 27 U/L (ref 6–29)
ANION GAP SERPL CALCULATED.4IONS-SCNC: 6 MMOL/L (CALC) (ref 7–17)
AST SERPL-CCNC: 23 U/L (ref 10–30)
BASOPHILS # BLD AUTO: 59 CELLS/UL (ref 0–200)
BASOPHILS NFR BLD AUTO: 0.9 %
BILIRUB SERPL-MCNC: 0.8 MG/DL (ref 0.2–1.2)
BUN SERPL-MCNC: 7 MG/DL (ref 7–25)
CALCIUM SERPL-MCNC: 9.1 MG/DL (ref 8.6–10.2)
CHLORIDE SERPL-SCNC: 107 MMOL/L (ref 98–110)
CHOLEST SERPL-MCNC: 171 MG/DL
CHOLEST/HDLC SERPL: 3.6 (CALC)
CO2 SERPL-SCNC: 26 MMOL/L (ref 20–32)
CREAT SERPL-MCNC: 0.62 MG/DL (ref 0.5–0.96)
CREAT UR-MCNC: 34 MG/DL (ref 20–275)
EGFRCR SERPLBLD CKD-EPI 2021: 124 ML/MIN/1.73M2
EOSINOPHIL # BLD AUTO: 221 CELLS/UL (ref 15–500)
EOSINOPHIL NFR BLD AUTO: 3.4 %
ERYTHROCYTE [DISTWIDTH] IN BLOOD BY AUTOMATED COUNT: 12.1 % (ref 11–15)
GLUCOSE SERPL-MCNC: 65 MG/DL (ref 65–139)
HCT VFR BLD AUTO: 40.3 % (ref 35–45)
HDLC SERPL-MCNC: 47 MG/DL
HGB BLD-MCNC: 13.7 G/DL (ref 11.7–15.5)
LDLC SERPL CALC-MCNC: 104 MG/DL (CALC)
LYMPHOCYTES # BLD AUTO: 1437 CELLS/UL (ref 850–3900)
LYMPHOCYTES NFR BLD AUTO: 22.1 %
MCH RBC QN AUTO: 30.2 PG (ref 27–33)
MCHC RBC AUTO-ENTMCNC: 34 G/DL (ref 32–36)
MCV RBC AUTO: 89 FL (ref 80–100)
MEV IGG SER IA-ACNC: 80.6 AU/ML
MICROALBUMIN UR-MCNC: <0.2 MG/DL
MONOCYTES # BLD AUTO: 514 CELLS/UL (ref 200–950)
MONOCYTES NFR BLD AUTO: 7.9 %
NEUTROPHILS # BLD AUTO: 4271 CELLS/UL (ref 1500–7800)
NEUTROPHILS NFR BLD AUTO: 65.7 %
NONHDLC SERPL-MCNC: 124 MG/DL (CALC)
PLATELET # BLD AUTO: 300 THOUSAND/UL (ref 140–400)
PMV BLD REES-ECKER: 8.9 FL (ref 7.5–12.5)
POTASSIUM SERPL-SCNC: 4.3 MMOL/L (ref 3.5–5.3)
PROT SERPL-MCNC: 6.9 G/DL (ref 6.1–8.1)
RBC # BLD AUTO: 4.53 MILLION/UL (ref 3.8–5.1)
SODIUM SERPL-SCNC: 139 MMOL/L (ref 135–146)
T4 FREE SERPL-MCNC: 1.3 NG/DL (ref 0.8–1.8)
TRIGL SERPL-MCNC: 100 MG/DL
TSH SERPL-ACNC: 5.66 MIU/L
WBC # BLD AUTO: 6.5 THOUSAND/UL (ref 3.8–10.8)

## 2025-03-28 ASSESSMENT — ENCOUNTER SYMPTOMS
RESPIRATORY NEGATIVE: 1
GASTROINTESTINAL NEGATIVE: 1
MUSCULOSKELETAL NEGATIVE: 1
CARDIOVASCULAR NEGATIVE: 1
CONSTITUTIONAL NEGATIVE: 1

## 2025-03-29 DIAGNOSIS — E10.65 TYPE 1 DIABETES MELLITUS WITH HYPERGLYCEMIA (MULTI): ICD-10-CM

## 2025-03-31 RX ORDER — BLOOD-GLUCOSE SENSOR
EACH MISCELLANEOUS
Qty: 9 EACH | Refills: 3 | Status: SHIPPED | OUTPATIENT
Start: 2025-03-31

## 2025-05-20 NOTE — PROGRESS NOTES
HPI   30 yo with Diabetes 1(dx age 12) , Hashimoto's dz, Dyslipidemia, depression/anxiety, pt has IUD presents for followup. A1c-6.8% last visit, % today.     Pt is testing sugars 7 times per day, using dexcom G6 . Pt is having low sugars 0-1 times/week. Pt is doing better with following a carb controlled diet and knows reasonable carb allowances. Pt is able to afford their medications. Pt is exercising.     -added ozempic Dec 2023 visit, wt down from 222lbs, currently on 1mg dose      Taking levothyroxine 150mcg, euthyroid, no obstructive sx.           Taking atorvastatin 40mg.           Tandem t-slim with control IQ, Dexcom G6   -has back up basal insulin at home    Time  Basal Rate (u/hour)  12:00 AM  2.0   3:00 AM  1.7   1:00 PM   2.4   5:00 PM  2.0  10:00 PM  1.4             Total Daily Basal:        45.4 units           Time  Correction Factor (mg/dl)  12:00 AM  20    Time  Carb Ratio (unit:grams)  12:00 AM   1:5   6:00 AM  1:3   1:00 PM  1:5   5:00 PM  1:3.5  10:00 PM  1:5     Insulin Summary  Average Total Daily Dose   units/day  Basal %     units  Bolus %    units     -infusion sets:  , changing every 3 days, denies site issues; supplies from        - 30 days dexcom data: 64% in range, 2% low, pattern: upper 100's overnight, low 100's on waking/upper 100's post, upper 100's until dinner, low 100's dinner/upper 100's post dinner into bedtime.  -noticed pattern of rising in am (has school some days/has am workouts at times)  -at times bolusing post meals and having stacking lows     11/24bb  1. Type 1 diabetes mellitus with hyperglycemia (Multi) (Primary)  -A1c ordered and reviewed  -labs reviewed, new labs ordered  -refills sent  -dexcom data reviewed, scanned in epic    -noticed pattern of rising in am (has school some days/has am workouts at times)  -at times bolusing post meals and having stacking lows       2. Mixed hyperlipidemia  -on statin and tolerating, please repeat labs    3. Hypothyroidism,  "acquired  -euthyroid on therapy, please repeat labs             Current Outpatient Medications:     atorvastatin (Lipitor) 40 mg tablet, TAKE 1 TABLET DAILY, Disp: 90 tablet, Rfl: 3    blood sugar diagnostic (OneTouch Verio test strips) strip, Test tid, Disp: 300 strip, Rfl: 3    Dexcom G6 Sensor device, USE AS DIRECTED EVERY 10 DAYS, Disp: 9 each, Rfl: 3    Dexcom G6 Transmitter device, , Disp: , Rfl:     Dexcom G7 Sensor device, CHANGE EVERY 10 DAYS (REPLACES DEXCOM G6), Disp: 9 each, Rfl: 3    glucagon 3 mg/actuation spray,non-aerosol, Administer 1 spray into affected nostril(s) if needed (for severe hypoglycemia)., Disp: 2 each, Rfl: 1    HumaLOG U-100 Insulin 100 unit/mL injection, INJECT 100 UNITS UNDER THE SKIN DAILY VIA PUMP, Disp: 90 mL, Rfl: 3    insulin syringe-needle U-100 31G X 5/16\" 0.5 mL syringe, use 4-6 daily for injections as needed for insulin pump failure, Disp: , Rfl:     lancets 33 gauge misc, as directed to check blood sugar daily for 90 days, Disp: , Rfl:     levonorgestrel (MIRENA UTRN), Mirena (Patient not taking: Reported on 3/25/2025), Disp: , Rfl:     levothyroxine (Synthroid, Levoxyl) 150 mcg tablet, TAKE 1 TABLET IN THE MORNING ON AN EMPTY STOMACH AS DIRECTED, Disp: 90 tablet, Rfl: 3    norelgestromin-ethin.estradioL (Xulane) 150-35 mcg/24 hr, Apply 1 patch each week for 3 weeks, then remove for 1 week., Disp: 3 patch, Rfl: 2    norelgestromin-ethin.estradioL (Xulane) 150-35 mcg/24 hr, Apply 1 patch each week for 3 weeks, then remove for 1 week., Disp: 3 patch, Rfl: 0    Ozempic 1 mg/dose (4 mg/3 mL) pen injector, INJECT 1 MG UNDER THE SKIN WEEKLY (DOSE INCREASE), Disp: 9 mL, Rfl: 3    Allergies as of 05/23/2025 - Reviewed 03/25/2025   Allergen Reaction Noted    Acetaminophen Unknown 12/05/2018    Corticosteroids (glucocorticoids) Unknown 12/05/2018       There were no vitals taken for this visit.    Labs:   Lab Results   Component Value Date    WBC 6.5 03/25/2025    NRBC 0.0 " "12/30/2023    RBC 4.53 03/25/2025    HGB 13.7 03/25/2025    HCT 40.3 03/25/2025     03/25/2025     Lab Results   Component Value Date    CALCIUM 9.1 03/25/2025    AST 23 03/25/2025    ALKPHOS 87 03/25/2025    BILITOT 0.8 03/25/2025    PROT 6.9 03/25/2025    ALBUMIN 4.5 03/25/2025    GLOB 3.2 09/13/2022    AGR 1.3 (L) 09/13/2022     03/25/2025    K 4.3 03/25/2025     03/25/2025    CO2 26 03/25/2025    ANIONGAP 6 (L) 03/25/2025    BUN 7 03/25/2025    CREATININE 0.62 03/25/2025    UREACREAUR 15.7 09/13/2022    GLUCOSE 65 03/25/2025    ALT 27 03/25/2025    EGFR 124 03/25/2025     Lab Results   Component Value Date    CHOL 171 03/25/2025    TRIG 100 03/25/2025    HDL 47 (L) 03/25/2025    LDLCALC 104 (H) 03/25/2025     Lab Results   Component Value Date    MICROALBCREA NOTE 03/25/2025     Lab Results   Component Value Date    TSH 5.66 (H) 03/25/2025     No results found for: \"[id number]\"  Lab Results   Component Value Date    HGBA1C 6.8 (A) 11/22/2024         Assessment/Plan   1. Type 1 diabetes mellitus with hyperglycemia (Multi) (Primary)  -A1c ordered and reviewed  -labs reviewed  -tandem source data reviewed with patient (scanned in epic)      2. Mixed hyperlipidemia      3. Hypothyroidism, acquired      4. Presence of hybrid closed-loop insulin pump system  -tandem t-slim with control IQ            Follow up:     -labs/tests/notes reviewed  -reviewed and counseled patient on medication monitoring and side effects    Treatment and plan discussed with Dr. Carreno. [place] RN Certified Diabetes [facility] and     Medical Decision Making  Complexity of problem: Chronic illness of diabetes mellitus uncontrolled, progressing  Data analyzed and reviewed: Reviewed prior notes, blood glucose data, labs including HgbA1c, lipids, serum chemistries.  Ordered tests.   Risk of complications and morbidities: Is definite because of use of insulin and risk of hypoglycemia.  Prescription medications " reviewed and modifications made.  Compliance assessed.  Addressed social determinants of health including food insecurity.

## 2025-05-23 ENCOUNTER — APPOINTMENT (OUTPATIENT)
Dept: ENDOCRINOLOGY | Facility: CLINIC | Age: 30
End: 2025-05-23
Payer: COMMERCIAL

## 2025-05-23 VITALS
SYSTOLIC BLOOD PRESSURE: 101 MMHG | BODY MASS INDEX: 33.28 KG/M2 | WEIGHT: 200 LBS | DIASTOLIC BLOOD PRESSURE: 62 MMHG | HEART RATE: 67 BPM

## 2025-05-23 DIAGNOSIS — E03.9 HYPOTHYROIDISM, ACQUIRED: ICD-10-CM

## 2025-05-23 DIAGNOSIS — E10.65 TYPE 1 DIABETES MELLITUS WITH HYPERGLYCEMIA (MULTI): Primary | ICD-10-CM

## 2025-05-23 DIAGNOSIS — E78.2 MIXED HYPERLIPIDEMIA: ICD-10-CM

## 2025-05-23 DIAGNOSIS — Z96.41 PRESENCE OF HYBRID CLOSED-LOOP INSULIN PUMP SYSTEM: ICD-10-CM

## 2025-05-23 LAB — POC HEMOGLOBIN A1C: 6.2 % (ref 4.2–6.5)

## 2025-05-23 PROCEDURE — 83036 HEMOGLOBIN GLYCOSYLATED A1C: CPT | Performed by: REGISTERED NURSE

## 2025-05-23 PROCEDURE — 83036 HEMOGLOBIN GLYCOSYLATED A1C: CPT | Mod: QW | Performed by: INTERNAL MEDICINE

## 2025-05-23 PROCEDURE — 99214 OFFICE O/P EST MOD 30 MIN: CPT | Performed by: INTERNAL MEDICINE

## 2025-05-23 PROCEDURE — 95251 CONT GLUC MNTR ANALYSIS I&R: CPT | Performed by: INTERNAL MEDICINE

## 2025-05-23 ASSESSMENT — PAIN SCALES - GENERAL: PAINLEVEL_OUTOF10: 0-NO PAIN

## 2025-05-23 NOTE — PATIENT INSTRUCTIONS
Work on taking cholesterol and thyroid medications daily    Repeat labwork before next visit    Change your infusion set every 3 days or 36 hours    Wear pump on same side of body as sensor    Start exercise mode 2 hours before activity    Keep up the good work!    A1c 6.2%

## 2025-05-23 NOTE — PROGRESS NOTES
Attestation signed by Jairon Carreno MD on 5/23/25 at 12:13 PM.    I, Dr Jairon Carreno, have reviewed this progress note, medication list, vital signs, any pertinent lab values, and any CGM data if present with the Certified Diabetes Care and  face to face during this visit today. This note reflects the treatment plan that was made under my direction after reviewing the above mentioned elements while face to face with the patient and CDE.  I personally answered and addressed any questions and concerns the patient had during the visit today.  The CDE entered the data in this note under my direction and I personally reviewed it, signed any lab or medication orders that I instructed to be completed. I am the billing provider for this visit and the level of service was determined by my involvement in the Medical Decision Making Component of this visit while face to face with the patient.

## 2025-06-07 ENCOUNTER — OFFICE VISIT (OUTPATIENT)
Dept: URGENT CARE | Age: 30
End: 2025-06-07
Payer: COMMERCIAL

## 2025-06-07 VITALS
DIASTOLIC BLOOD PRESSURE: 83 MMHG | OXYGEN SATURATION: 99 % | HEIGHT: 65 IN | TEMPERATURE: 98.2 F | BODY MASS INDEX: 32.99 KG/M2 | WEIGHT: 198 LBS | SYSTOLIC BLOOD PRESSURE: 133 MMHG | HEART RATE: 76 BPM

## 2025-06-07 DIAGNOSIS — H65.03 BILATERAL ACUTE SEROUS OTITIS MEDIA, RECURRENCE NOT SPECIFIED: ICD-10-CM

## 2025-06-07 DIAGNOSIS — B34.8 RHINOVIRUS: ICD-10-CM

## 2025-06-07 DIAGNOSIS — J02.9 SORE THROAT: Primary | ICD-10-CM

## 2025-06-07 LAB
POC HUMAN RHINOVIRUS PCR: POSITIVE
POC INFLUENZA A VIRUS PCR: NEGATIVE
POC INFLUENZA B VIRUS PCR: NEGATIVE
POC RESPIRATORY SYNCYTIAL VIRUS PCR: NEGATIVE
POC STREPTOCOCCUS PYOGENES (GROUP A STREP) PCR: NEGATIVE

## 2025-06-07 ASSESSMENT — ENCOUNTER SYMPTOMS: SORE THROAT: 1

## 2025-06-07 NOTE — PROGRESS NOTES
"Subjective   Patient ID: Haley Sheets is a 29 y.o. female. They present today with a chief complaint of Illness (Throat hurts, ears hurt. Does not feel like sinuses - Entered by patient) and Sore Throat (Sore throat and ear pain since thursday).    History of Present Illness  Patient is a pleasant 29-year-old white female, past medical history of type 1 diabetes, hypothyroid, presented to clinic for to complaint of upper respiratory congestion.  Patient is reporting 2-day history of upper respiratory congestion with increasing ear pressure and sore throat.  She denies any cough or sputum production.  No fever or chills.  No chest pain or shortness of breath.  No dysphagia odynophagia trismus drooling or change in voice.  She wants to ensure does not have strep throat.  No further complaints at this time.        Illness  Associated symptoms: sore throat    Sore Throat         Past Medical History  Allergies as of 06/07/2025 - Reviewed 06/07/2025   Allergen Reaction Noted    Acetaminophen Unknown 12/05/2018    Corticosteroids (glucocorticoids) Unknown 12/05/2018       Prescriptions Prior to Admission[1]       Medical History[2]    Surgical History[3]     reports that she has never smoked. She has never been exposed to tobacco smoke. She has never used smokeless tobacco. She reports that she does not currently use alcohol. She reports that she does not use drugs.    Review of Systems  Review of Systems   HENT:  Positive for sore throat.                                   Objective    Vitals:    06/07/25 1038   BP: 133/83   Pulse: 76   Temp: 36.8 °C (98.2 °F)   SpO2: 99%   Weight: 89.8 kg (198 lb)   Height: 1.651 m (5' 5\")     Patient's last menstrual period was 05/30/2025 (approximate).    Physical Exam    General: Vitals Noted. No distress. Normocephalic.     HEENT: TMs normal however clear air-fluid levels are noted bilaterally, EOMI, normal conjunctiva, patent nares with erythematous edematous and appear nasal " turbinates and clear rhinorrhea bilaterally.  Posterior oropharynx with signs of postnasal drainage without any erythema swelling or tonsillar exudate.  Uvula is in the midline and nonedematous.  No drooling.  No trismus.    Neck: Supple with no adenopathy.     Cardiac: Regular Rate and Rhythm. No murmur.     Pulmonary: Equal breath sounds bilaterally. No wheezes, rhonchi, or rales.    Abdomen: Soft, non-tender, with normal bowel sounds.     Musculoskeletal: Moves all extremities, no effusion, no edema.     Skin: No obvious rashes.  Procedures    Point of Care Test & Imaging Results from this visit  Results for orders placed or performed in visit on 06/07/25   POCT SPOTFIRE R/ST Panel Mini w/Strep A ([facility]) manually resulted    Collection Time: 06/07/25 11:02 AM   Result Value Ref Range    POC Group A Strep, PCR Negative Negative    POC Respiratory Syncytial Virus PCR Negative Negative    POC Influenza A Virus PCR Negative Negative    POC Influenza B Virus PCR Negative Negative    POC Human Rhinovirus PCR Positive (A) Negative      Imaging  No results found.    Cardiology, Vascular, and Other Imaging  No other imaging results found for the past 2 days      Diagnostic study results (if any) were reviewed by Tico Philip PA-C.    Assessment/Plan   Allergies, medications, history, and pertinent labs/EKGs/Imaging reviewed by Tico Philip PA-C.     Medical Decision Making  Patient was seen eval in the clinic for chief complaint of upper respiratory congestion, ear pressure, and sore throat.  On exam patient is nontoxic very well-appearing respite comfortably no acute distress.  Vital signs are stable, afebrile.  Chest is clear, heart is regular, belly is diffusely soft and nontender.  ENT examination as above concerning for viral illness.  [employer] testing performed returned positive for rhinovirus.  She also has concurrent bilateral serous otitis media.  Advise daily use of Flonase and  pseudoephedrine.  Advised ibuprofen as needed for sore throat.  Advise plenty clear fluids and rest.  Discharged home at this time.  Reviewed my impression, plan, strict return was report to ED precautions with the patient.  She expresses understanding and agreement plan of care.    Orders and Diagnoses  Diagnoses and all orders for this visit:  Sore throat  -     POCT SPOTFIRE R/ST Panel Mini w/Strep A ([facility]) manually resulted        Medical Admin Record      Follow Up Instructions  No follow-ups on file.    Patient disposition: Home    Electronically signed by Tico Philip PA-C  11:03 AM         [1] (Not in a hospital admission)  [2]   Past Medical History:  Diagnosis Date    Diabetes mellitus type I (Multi)     Hyperlipidemia     Hypothyroidism     Other specified health status     No pertinent past medical history   [3]   Past Surgical History:  Procedure Laterality Date    ABCESS DRAINAGE Left 01/2020    breast    MOUTH SURGERY  11/06/2014    Oral Surgery Tooth Extraction

## 2025-06-22 ENCOUNTER — OFFICE VISIT (OUTPATIENT)
Dept: URGENT CARE | Age: 30
End: 2025-06-22
Payer: COMMERCIAL

## 2025-06-22 VITALS
DIASTOLIC BLOOD PRESSURE: 86 MMHG | RESPIRATION RATE: 20 BRPM | BODY MASS INDEX: 32.49 KG/M2 | HEIGHT: 65 IN | SYSTOLIC BLOOD PRESSURE: 124 MMHG | TEMPERATURE: 97 F | WEIGHT: 195 LBS | OXYGEN SATURATION: 99 % | HEART RATE: 98 BPM

## 2025-06-22 DIAGNOSIS — H10.33 ACUTE BACTERIAL CONJUNCTIVITIS OF BOTH EYES: Primary | ICD-10-CM

## 2025-06-22 PROCEDURE — 3079F DIAST BP 80-89 MM HG: CPT | Performed by: PHYSICIAN ASSISTANT

## 2025-06-22 PROCEDURE — 3044F HG A1C LEVEL LT 7.0%: CPT | Performed by: PHYSICIAN ASSISTANT

## 2025-06-22 PROCEDURE — 3074F SYST BP LT 130 MM HG: CPT | Performed by: PHYSICIAN ASSISTANT

## 2025-06-22 PROCEDURE — 99213 OFFICE O/P EST LOW 20 MIN: CPT | Performed by: PHYSICIAN ASSISTANT

## 2025-06-22 PROCEDURE — 3008F BODY MASS INDEX DOCD: CPT | Performed by: PHYSICIAN ASSISTANT

## 2025-06-22 RX ORDER — OFLOXACIN 3 MG/ML
1 SOLUTION/ DROPS OPHTHALMIC 4 TIMES DAILY
Qty: 5 ML | Refills: 0 | Status: SHIPPED | OUTPATIENT
Start: 2025-06-22 | End: 2025-06-29

## 2025-06-22 NOTE — PROGRESS NOTES
"Subjective   Patient ID: Haley Sheets is a 29 y.o. female. They present today with a chief complaint of Illness (I think I have pink eye - Entered by patient).    History of Present Illness  Patient is a very pleasant 27-year-old white female, past medical history of insulin-dependent diabetes, presenting to the clinic for chief complaint of bilateral eye redness.  Patient is reporting 2-day history of worsening bilateral eye redness with copious amounts of purulent discharge and matting of the eyes shut this morning.  She does note she recently tested positive for rhinovirus.  She denies any double or blurry vision.  No light or photosensitivity.  No pain with extraocular movements, and denies any swelling of the orbits.  She does report that upper respiratory congestion and rhinorrhea as relates to rhinovirus has drastically improved.  Denies cough or sputum production.  No nausea vomiting or diarrhea.  No rashes.  No further complaints at this time.  She does wear corrective lenses, does not wear contacts.      Illness      Past Medical History  Allergies as of 06/22/2025 - Reviewed 06/22/2025   Allergen Reaction Noted    Acetaminophen Unknown 12/05/2018    Corticosteroids (glucocorticoids) Unknown 12/05/2018       Prescriptions Prior to Admission[1]       Medical History[2]    Surgical History[3]     reports that she has never smoked. She has never been exposed to tobacco smoke. She has never used smokeless tobacco. She reports that she does not currently use alcohol. She reports that she does not use drugs.    Review of Systems  Review of Systems                               Objective    Vitals:    06/22/25 1129   BP: 124/86   Pulse: 98   Resp: 20   Temp: 36.1 °C (97 °F)   TempSrc: Oral   SpO2: 99%   Weight: 88.5 kg (195 lb)   Height: 1.651 m (5' 5\")     Patient's last menstrual period was 05/30/2025 (approximate).    Physical Exam  General: Vitals Noted. No distress. Normocephalic.     HEENT: Evaluation of  " bilateral eyes reveals palpebral and bulbar conjunctival injection with moderate amount of mucopurulent discharge noted in the medial canthus.  There is some crusting to the eyelids as well.  There is no orbital edema with no signs of.  Postseptal cellulitis.  EOMs are intact and painless.  TMs normal, patent nares, Normal OP    Neck: Supple with no adenopathy.     Cardiac: Regular Rate and Rhythm. No murmur.     Pulmonary: Equal breath sounds bilaterally. No wheezes, rhonchi, or rales.    Abdomen: Soft, non-tender, with normal bowel sounds.     Musculoskeletal: Moves all extremities, no effusion, no edema.     Skin: No obvious rashes.  Procedures    Point of Care Test & Imaging Results from this visit    Imaging  No results found.    Cardiology, Vascular, and Other Imaging  No other imaging results found for the past 2 days      Diagnostic study results (if any) were reviewed by Tico Philip PA-C.    Assessment/Plan   Allergies, medications, history, and pertinent labs/EKGs/Imaging reviewed by Tico Philip PA-C.     Medical Decision Making  Patient was seen eval in the clinic for chief complaint of bilateral eye redness.  On exam patient is nontoxic well-appearing respirator comfortably no acute distress.  Vital signs are stable, afebrile.  Chest clear, heart is regular, belly soft nontender peer evaluation of bilateral eyes as above consistent with a bacterial conjunctivitis.  Will provide ofloxacin drops 4 times a day for the next 7 days.  Minimal concern for.  Preseptal cellulitis.  Minimal concern for scleritis.  Minimal concern for stye.  Will be discharged home in stable condition with instruction to follow-up with primary care physician in the next 2 to 3 days.  I reviewed my impression, plan, strict return precautions with the patient.  She expresses understanding and agreement with plan of care.    Orders and Diagnoses  There are no diagnoses linked to this encounter.      Medical Admin  Record      Follow Up Instructions  No follow-ups on file.    Patient disposition: Home    Electronically signed by Tico Philip PA-C  11:34 AM         [1] (Not in a hospital admission)  [2]   Past Medical History:  Diagnosis Date    Diabetes mellitus type I (Multi)     Hyperlipidemia     Hypothyroidism     Other specified health status     No pertinent past medical history   [3]   Past Surgical History:  Procedure Laterality Date    ABCESS DRAINAGE Left 01/2020    breast    MOUTH SURGERY  11/06/2014    Oral Surgery Tooth Extraction

## 2025-08-06 DIAGNOSIS — E10.65 TYPE 1 DIABETES MELLITUS WITH HYPERGLYCEMIA (MULTI): ICD-10-CM

## 2025-08-06 RX ORDER — INSULIN LISPRO 100 [IU]/ML
INJECTION, SOLUTION INTRAVENOUS; SUBCUTANEOUS
Qty: 90 ML | Refills: 3 | Status: SHIPPED | OUTPATIENT
Start: 2025-08-06

## 2025-08-11 DIAGNOSIS — E10.65 TYPE 1 DIABETES MELLITUS WITH HYPERGLYCEMIA (MULTI): ICD-10-CM

## 2025-08-11 RX ORDER — ATORVASTATIN CALCIUM 40 MG/1
40 TABLET, FILM COATED ORAL DAILY
Qty: 90 TABLET | Refills: 3 | Status: SHIPPED | OUTPATIENT
Start: 2025-08-11

## 2025-08-21 DIAGNOSIS — E10.65 TYPE 1 DIABETES MELLITUS WITH HYPERGLYCEMIA (MULTI): ICD-10-CM

## 2025-08-21 RX ORDER — LEVOTHYROXINE SODIUM 150 UG/1
150 TABLET ORAL EVERY MORNING
Qty: 90 TABLET | Refills: 3 | Status: SHIPPED | OUTPATIENT
Start: 2025-08-21

## 2025-08-27 ENCOUNTER — INITIAL PRENATAL (OUTPATIENT)
Dept: OBSTETRICS AND GYNECOLOGY | Facility: CLINIC | Age: 30
End: 2025-08-27
Payer: COMMERCIAL

## 2025-08-27 VITALS — WEIGHT: 210.4 LBS | DIASTOLIC BLOOD PRESSURE: 83 MMHG | BODY MASS INDEX: 35.01 KG/M2 | SYSTOLIC BLOOD PRESSURE: 119 MMHG

## 2025-08-27 DIAGNOSIS — O24.311: ICD-10-CM

## 2025-08-27 DIAGNOSIS — Z11.3 SCREEN FOR STD (SEXUALLY TRANSMITTED DISEASE): ICD-10-CM

## 2025-08-27 DIAGNOSIS — O36.80X0 PREGNANCY WITH UNCERTAIN FETAL VIABILITY, SINGLE OR UNSPECIFIED FETUS (HHS-HCC): ICD-10-CM

## 2025-08-27 DIAGNOSIS — Z32.01 POSITIVE PREGNANCY TEST (HHS-HCC): Primary | ICD-10-CM

## 2025-08-27 LAB
POC APPEARANCE, URINE: CLEAR
POC BILIRUBIN, URINE: NEGATIVE
POC BLOOD, URINE: NEGATIVE
POC COLOR, URINE: YELLOW
POC GLUCOSE, URINE: NEGATIVE MG/DL
POC KETONES, URINE: NEGATIVE MG/DL
POC LEUKOCYTES, URINE: NEGATIVE
POC NITRITE,URINE: NEGATIVE
POC PH, URINE: 7 PH
POC PROTEIN, URINE: NEGATIVE MG/DL
POC SPECIFIC GRAVITY, URINE: 1.01
POC UROBILINOGEN, URINE: 0.2 EU/DL
PREGNANCY TEST URINE, POC: POSITIVE

## 2025-08-27 PROCEDURE — 81003 URINALYSIS AUTO W/O SCOPE: CPT | Performed by: OBSTETRICS & GYNECOLOGY

## 2025-08-27 PROCEDURE — 81025 URINE PREGNANCY TEST: CPT | Performed by: OBSTETRICS & GYNECOLOGY

## 2025-08-27 PROCEDURE — 99213 OFFICE O/P EST LOW 20 MIN: CPT

## 2025-08-27 ASSESSMENT — LIFESTYLE VARIABLES
AUDIT TOTAL SCORE: 0
MEDICAL PROBLEMS AS RESULT OF DRUG USE: NO
AUDIT-C TOTAL SCORE: 0
ABLE TO STOP USING DRUGS WHEN WANT: YES
HAVE YOU OR SOMEONE ELSE BEEN INJURED AS A RESULT OF YOUR DRINKING: NO
DAST10 TOTAL SCORE: 0
EXPERIENCED WITHDRAWAL SYMPTOMS DUE TO HEAVY DRUG INTAKE: NO
MISUSED PRESCRIPTION DRUGS: NO
USE MORE THAN ONE DRUG AT A TIME: NO
NEGLECTED FAMILY OR WORK DUE TO DRUG USE: NO
ENGAGED IN ILLEGAL ACTIVITIES TO OBTAIN DRUGS: NO
BLACKOUTS OR FLASHBACKS DUE TO DRUG USE: NO
HOW MANY STANDARD DRINKS CONTAINING ALCOHOL DO YOU HAVE ON A TYPICAL DAY: PATIENT DOES NOT DRINK
HOW OFTEN DO YOU HAVE SIX OR MORE DRINKS ON ONE OCCASION: NEVER
SKIP TO QUESTIONS 9-10: 1
FEEL BAD ABOUT YOUR DRUG USE: NO
HAS A RELATIVE, FRIEND, DOCTOR, OR ANOTHER HEALTH PROFESSIONAL EXPRESSED CONCERN ABOUT YOUR DRINKING OR SUGGESTED YOU CUT DOWN: NO
SPOUSE OR PARENTS COMPLAIN ABOUT INVOLVEMENT WITH DRUGS: NO
HOW OFTEN DO YOU HAVE A DRINK CONTAINING ALCOHOL: NEVER

## 2025-08-27 ASSESSMENT — EDINBURGH POSTNATAL DEPRESSION SCALE (EPDS)
I HAVE BEEN SO UNHAPPY THAT I HAVE HAD DIFFICULTY SLEEPING: NOT AT ALL
I HAVE FELT SAD OR MISERABLE: NO, NOT AT ALL
I HAVE BEEN ABLE TO LAUGH AND SEE THE FUNNY SIDE OF THINGS: AS MUCH AS I ALWAYS COULD
THINGS HAVE BEEN GETTING ON TOP OF ME: NO, I HAVE BEEN COPING AS WELL AS EVER
I HAVE LOOKED FORWARD WITH ENJOYMENT TO THINGS: AS MUCH AS I EVER DID
I HAVE BEEN SO UNHAPPY THAT I HAVE BEEN CRYING: NO, NEVER
THE THOUGHT OF HARMING MYSELF HAS OCCURRED TO ME: NEVER
I HAVE BLAMED MYSELF UNNECESSARILY WHEN THINGS WENT WRONG: NO, NEVER
I HAVE BEEN ANXIOUS OR WORRIED FOR NO GOOD REASON: NO, NOT AT ALL
TOTAL SCORE: 0
I HAVE FELT SCARED OR PANICKY FOR NO GOOD REASON: NO, NOT AT ALL

## 2025-08-27 ASSESSMENT — ENCOUNTER SYMPTOMS
DEPRESSION: 0
OCCASIONAL FEELINGS OF UNSTEADINESS: 0
LOSS OF SENSATION IN FEET: 0

## 2025-08-27 ASSESSMENT — PAIN SCALES - GENERAL: PAINLEVEL_OUTOF10: 0 - NO PAIN

## 2025-08-27 ASSESSMENT — PAIN - FUNCTIONAL ASSESSMENT: PAIN_FUNCTIONAL_ASSESSMENT: 0-10

## 2025-08-29 ENCOUNTER — RESULTS FOLLOW-UP (OUTPATIENT)
Dept: OBSTETRICS AND GYNECOLOGY | Facility: HOSPITAL | Age: 30
End: 2025-08-29
Payer: COMMERCIAL

## 2025-08-29 LAB
BACTERIA UR CULT: NORMAL
C TRACH RRNA SPEC QL NAA+PROBE: NOT DETECTED
N GONORRHOEA RRNA SPEC QL NAA+PROBE: NOT DETECTED
QUEST GC CT AMPLIFIED (ALWAYS MESSAGE): NORMAL